# Patient Record
Sex: FEMALE | Race: BLACK OR AFRICAN AMERICAN | NOT HISPANIC OR LATINO | Employment: PART TIME | URBAN - METROPOLITAN AREA
[De-identification: names, ages, dates, MRNs, and addresses within clinical notes are randomized per-mention and may not be internally consistent; named-entity substitution may affect disease eponyms.]

---

## 2024-01-12 ENCOUNTER — HOSPITAL ENCOUNTER (EMERGENCY)
Facility: HOSPITAL | Age: 24
Discharge: HOME | End: 2024-01-12
Attending: EMERGENCY MEDICINE
Payer: COMMERCIAL

## 2024-01-12 VITALS
SYSTOLIC BLOOD PRESSURE: 135 MMHG | TEMPERATURE: 98 F | WEIGHT: 285.94 LBS | DIASTOLIC BLOOD PRESSURE: 79 MMHG | OXYGEN SATURATION: 100 % | RESPIRATION RATE: 18 BRPM | HEART RATE: 65 BPM

## 2024-01-12 DIAGNOSIS — S05.00XA CORNEAL ABRASION: Primary | ICD-10-CM

## 2024-01-12 PROCEDURE — 99284 EMERGENCY DEPT VISIT MOD MDM: CPT | Performed by: PHYSICIAN ASSISTANT

## 2024-01-12 PROCEDURE — 99283 EMERGENCY DEPT VISIT LOW MDM: CPT

## 2024-01-12 RX ORDER — GENTAMICIN SULFATE 3 MG/ML
1 SOLUTION/ DROPS OPHTHALMIC ONCE
Status: COMPLETED | OUTPATIENT
Start: 2024-01-12 | End: 2024-01-12

## 2024-01-12 RX ORDER — TETRACAINE HYDROCHLORIDE 5 MG/ML
1 SOLUTION OPHTHALMIC ONCE
Status: COMPLETED | OUTPATIENT
Start: 2024-01-12 | End: 2024-01-12

## 2024-01-12 RX ADMIN — TETRACAINE HYDROCHLORIDE 1 DROP: 5 SOLUTION OPHTHALMIC at 17:45

## 2024-01-12 RX ADMIN — FLUORESCEIN SODIUM 1 STRIP: 1 STRIP OPHTHALMIC at 17:45

## 2024-01-12 RX ADMIN — GENTAMICIN SULFATE 1 DROP: 3 SOLUTION/ DROPS OPHTHALMIC at 18:11

## 2024-01-12 NOTE — DISCHARGE INSTRUCTIONS
Return sooner if increased pain, fever, vomiting, worsening vision, redness.    Use gentamicin eye drops one drop in affected eye(s) every 4 hours while awake for 1 week.

## 2024-01-12 NOTE — ED PROVIDER NOTES
History  Chief Complaint   Patient presents with    Eye Injury     Pt c/o right eye pain after poking her eye this morning      22 yo female with right pain. States she scratched it with her finger/nail earlier today. Blurred vision since then. Constant. Photophobia. Wears glasses but not contacts. No other injuries.       History provided by:  Patient   used: No        None       History reviewed. No pertinent past medical history.    History reviewed. No pertinent surgical history.    History reviewed. No pertinent family history.  I have reviewed and agree with the history as documented.    E-Cigarette/Vaping     E-Cigarette/Vaping Substances     Social History     Tobacco Use    Smoking status: Never    Smokeless tobacco: Never   Substance Use Topics    Drug use: Never       Review of Systems   Constitutional:  Negative for chills and fever.   HENT:  Negative for ear pain and sore throat.    Eyes:  Positive for photophobia and pain. Negative for visual disturbance.   Respiratory:  Negative for cough and shortness of breath.    Cardiovascular:  Negative for chest pain and palpitations.   Gastrointestinal:  Negative for abdominal pain and vomiting.   Genitourinary:  Negative for dysuria and hematuria.   Musculoskeletal:  Negative for arthralgias and back pain.   Skin:  Negative for color change and rash.   Neurological:  Negative for seizures and syncope.   All other systems reviewed and are negative.      Physical Exam  Physical Exam  Vitals and nursing note reviewed.   Constitutional:       General: She is not in acute distress.     Appearance: She is well-developed.   HENT:      Head: Normocephalic and atraumatic.   Eyes:      Conjunctiva/sclera: Conjunctivae normal.      Comments: Corneal abrasion 3 o'clock position seen with staining. Pain was immediately relieved with tetracaine drops.    Cardiovascular:      Rate and Rhythm: Normal rate and regular rhythm.      Heart sounds: No murmur  heard.  Pulmonary:      Effort: Pulmonary effort is normal. No respiratory distress.      Breath sounds: Normal breath sounds.   Abdominal:      Palpations: Abdomen is soft.      Tenderness: There is no abdominal tenderness.   Musculoskeletal:         General: No swelling.      Cervical back: Neck supple.   Skin:     General: Skin is warm and dry.      Capillary Refill: Capillary refill takes less than 2 seconds.   Neurological:      Mental Status: She is alert.   Psychiatric:         Mood and Affect: Mood normal.         Vital Signs  ED Triage Vitals [01/12/24 1714]   Temperature Pulse Respirations Blood Pressure SpO2   98 °F (36.7 °C) 65 18 135/79 100 %      Temp Source Heart Rate Source Patient Position - Orthostatic VS BP Location FiO2 (%)   Tympanic Monitor Sitting Left arm --      Pain Score       --           Vitals:    01/12/24 1714   BP: 135/79   Pulse: 65   Patient Position - Orthostatic VS: Sitting         Visual Acuity      ED Medications  Medications   tetracaine 0.5 % ophthalmic solution 1 drop (1 drop Left Eye Given 1/12/24 1745)   fluorescein sodium sterile ophthalmic strip 1 strip (1 strip Left Eye Given 1/12/24 1745)   gentamicin (GARAMYCIN) 0.3 % ophthalmic solution 1 drop (1 drop Right Eye Given 1/12/24 1811)       Diagnostic Studies  Results Reviewed       None                   No orders to display              Procedures  Procedures         ED Course                               SBIRT 20yo+      Flowsheet Row Most Recent Value   Initial Alcohol Screen: US AUDIT-C     1. How often do you have a drink containing alcohol? 0 Filed at: 01/12/2024 1717   2. How many drinks containing alcohol do you have on a typical day you are drinking?  0 Filed at: 01/12/2024 1717   3b. FEMALE Any Age, or MALE 65+: How often do you have 4 or more drinks on one occassion? 0 Filed at: 01/12/2024 1717   Audit-C Score 0 Filed at: 01/12/2024 1717   JOEL: How many times in the past year have you...    Used an illegal  drug or used a prescription medication for non-medical reasons? Never Filed at: 01/12/2024 1717                      Medical Decision Making  Ddx includes but is not limited to corneal abrasion, ulcer, FB. Plan: staining/tetracaine    MDM: 22 yo with eye pain. Relieved with tetracaine. Abrasion seen with staining. Start drops. Ophtho f/u. No contacts. Return parameters provided. Pt understands and agrees with plan.      Risk  Prescription drug management.             Disposition  Final diagnoses:   Corneal abrasion - right eye     Time reflects when diagnosis was documented in both MDM as applicable and the Disposition within this note       Time User Action Codes Description Comment    1/12/2024  5:52 PM Carl Rodriguez Add [S05.00XA] Corneal abrasion     1/12/2024  5:52 PM Carl Rodriguez Modify [S05.00XA] Corneal abrasion right eye          ED Disposition       ED Disposition   Discharge    Condition   Stable    Date/Time   Fri Jan 12, 2024 1752    Comment   Kerry Madera discharge to home/self care.                   Follow-up Information       Follow up With Specialties Details Why Contact Info    [facility] [address] [address]  [phone]            There are no discharge medications for this patient.      No discharge procedures on file.    PDMP Review       None            ED Provider  Electronically Signed by             Carl Rodriguez PA-C  01/12/24 3740

## 2024-04-15 ENCOUNTER — HOSPITAL ENCOUNTER (EMERGENCY)
Facility: HOSPITAL | Age: 24
Discharge: HOME | End: 2024-04-15
Attending: EMERGENCY MEDICINE
Payer: COMMERCIAL

## 2024-04-15 VITALS
OXYGEN SATURATION: 99 % | RESPIRATION RATE: 18 BRPM | DIASTOLIC BLOOD PRESSURE: 73 MMHG | HEART RATE: 66 BPM | SYSTOLIC BLOOD PRESSURE: 135 MMHG | TEMPERATURE: 97.6 F

## 2024-04-15 DIAGNOSIS — R10.9 ABDOMINAL DISCOMFORT: Primary | ICD-10-CM

## 2024-04-15 PROCEDURE — 99283 EMERGENCY DEPT VISIT LOW MDM: CPT | Performed by: EMERGENCY MEDICINE

## 2024-04-15 PROCEDURE — 99283 EMERGENCY DEPT VISIT LOW MDM: CPT

## 2024-04-15 RX ORDER — POLYETHYLENE GLYCOL 3350 17 G/17G
17 POWDER, FOR SOLUTION ORAL ONCE
Status: COMPLETED | OUTPATIENT
Start: 2024-04-15 | End: 2024-04-15

## 2024-04-15 RX ORDER — POLYETHYLENE GLYCOL 3350 17 G/17G
17 POWDER, FOR SOLUTION ORAL DAILY
Qty: 10 EACH | Refills: 0 | Status: SHIPPED | OUTPATIENT
Start: 2024-04-15

## 2024-04-15 RX ADMIN — POLYETHYLENE GLYCOL 3350 17 G: 17 POWDER, FOR SOLUTION ORAL at 21:15

## 2024-04-16 NOTE — ED PROVIDER NOTES
History  Chief Complaint   Patient presents with    Abdominal Pain     Pt arrives c/o abdominal discomfort and bloating x 2 months. Pt denies N/V/D. Denies hx.      23-year-old female with no pertinent medical history presents to ED for evaluation of abdominal discomfort, bloating for 2 months.  Denies nausea, vomiting, diarrhea, dysuria, hematuria, increased urinary frequency.  Patient currently on her menstrual cycle.  Patient was seen at urgent care earlier today.  They advised her to come to ED for further evaluation.  Patient denies any recent change in symptomology.  Symptoms have been present for the past 2 months.  Patient describes early satiety.  Stating that she has not been as hungry as usual in the past 2 months.  Denies history of similar symptoms.  Abdominal discomfort is a crampy ache in a generalized fashion across abdomen.  Has not seen GI for evaluation of abdominal discomfort.  Denies recent change in diet. States that she does have bowel movement almost daily however the bowel movements are not large in size.  Patient stating that she feels the bowel movements should be larger.  Denies any blood in stool.         None       History reviewed. No pertinent past medical history.    History reviewed. No pertinent surgical history.    History reviewed. No pertinent family history.  I have reviewed and agree with the history as documented.    E-Cigarette/Vaping     E-Cigarette/Vaping Substances     Social History     Tobacco Use    Smoking status: Never    Smokeless tobacco: Never   Substance Use Topics    Drug use: Never       Review of Systems   Constitutional:  Negative for chills, diaphoresis, fatigue and fever.   HENT:  Negative for ear pain and sore throat.    Eyes:  Negative for pain and visual disturbance.   Respiratory:  Negative for cough and shortness of breath.    Cardiovascular:  Negative for chest pain and palpitations.   Gastrointestinal:  Positive for constipation. Negative for blood  in stool, diarrhea, nausea, rectal pain and vomiting.        Positive for abdominal discomfort, bloating   Genitourinary:  Negative for dysuria and hematuria.   Musculoskeletal:  Negative for arthralgias and back pain.   Skin:  Negative for color change and rash.   Neurological:  Negative for seizures and syncope.   All other systems reviewed and are negative.      Physical Exam  Physical Exam  Vitals and nursing note reviewed.   Constitutional:       General: She is not in acute distress.     Appearance: She is well-developed. She is not ill-appearing, toxic-appearing or diaphoretic.   HENT:      Head: Normocephalic and atraumatic.   Eyes:      Conjunctiva/sclera: Conjunctivae normal.   Cardiovascular:      Rate and Rhythm: Normal rate and regular rhythm.      Heart sounds: Normal heart sounds. No murmur heard.     No friction rub. No gallop.   Pulmonary:      Effort: Pulmonary effort is normal. No respiratory distress.      Breath sounds: Normal breath sounds. No stridor. No wheezing, rhonchi or rales.   Abdominal:      Palpations: Abdomen is soft. There is no shifting dullness, fluid wave, hepatomegaly, splenomegaly, mass or pulsatile mass.      Tenderness: There is no abdominal tenderness. There is no right CVA tenderness, left CVA tenderness, guarding or rebound. Negative signs include Mccullough's sign, Rovsing's sign and McBurney's sign.      Hernia: No hernia is present.   Musculoskeletal:         General: No swelling.      Cervical back: Neck supple.   Skin:     General: Skin is warm and dry.      Capillary Refill: Capillary refill takes less than 2 seconds.   Neurological:      Mental Status: She is alert.   Psychiatric:         Mood and Affect: Mood normal.         Vital Signs  ED Triage Vitals [04/15/24 2014]   Temperature Pulse Respirations Blood Pressure SpO2   97.6 °F (36.4 °C) 66 18 135/73 99 %      Temp Source Heart Rate Source Patient Position - Orthostatic VS BP Location FiO2 (%)   Temporal Monitor  Sitting Left arm --      Pain Score       --           Vitals:    04/15/24 2014   BP: 135/73   Pulse: 66   Patient Position - Orthostatic VS: Sitting         Visual Acuity      ED Medications  Medications   polyethylene glycol (MIRALAX) packet 17 g (17 g Oral Given 4/15/24 2115)       Diagnostic Studies  Results Reviewed       None                   No orders to display              Procedures  Procedures         ED Course                                             Medical Decision Making  23-year-old female presents to ED for evaluation of abdominal discomfort, bloating for 2 months as above.  On physical examination vital signs stable.  Afebrile, nontachycardic.  Alert and responding to questions appropriately.  No murmur.  Normal breath sounds.  Nontoxic-appearing.  No distinguishable tenderness to palpation.  Performed deep palpation of all 4 quadrants without producing pain.  No rash.  No overlying skin changes of abdomen.  No urinary symptoms.  Patient currently on her menstrual cycle.  Symptoms have been present for 2 months.  Presentation without concerning symptoms to suggest acute abdomen.  Certainly feel patient would benefit from follow-up with GI to discuss symptoms and see if further evaluation  indicated.  Referral and contact information to GI provided to patient.  In the meantime, providing patient with MiraLAX to trial to see if the abdominal discomfort, bloating is related to constipation.  MiraLAX given in ED.  Also sent prescription for MiraLAX to patient's preferred pharmacy.  Encouraged fiber intake, fluid consumption.  Certainly should return for any new or worsening symptoms.  Patient in agreement with plan.  Patient discharged.    Prior to discharge, discharge instructions were discussed with patient at bedside. Patient was provided both verbal and written instructions. Patient is understanding of the discharge instructions and is agreeable to plan of care. Return precautions were  discussed with patient bedside, patient verbalized understanding of signs and symptoms that would necessitate return to the ED. All questions were answered. Patient was comfortable with the plan of care and discharged to home.              Disposition  Final diagnoses:   Abdominal discomfort     Time reflects when diagnosis was documented in both MDM as applicable and the Disposition within this note       Time User Action Codes Description Comment    4/15/2024  9:00 PM Michael Canchola Add [R10.9] Abdominal discomfort           ED Disposition       ED Disposition   Discharge    Condition   Stable    Date/Time   Mon Apr 15, 2024  8:59 PM    Comment   Kerry Madera discharge to home/self care.                   Follow-up Information       Follow up With Specialties Details Why Contact Info Additional Information    [facility]   [address] [place] [address]  [address]  [facility] [address]  [phone] [facility] [place], [address] [place] [address], [address] [place], [place], [address] [phone]             Patient's Medications   Discharge Prescriptions    POLYETHYLENE GLYCOL (MIRALAX) 17 G PACKET    Take 17 g by mouth daily       Start Date: 4/15/2024 End Date: --       Order Dose: 17 g       Quantity: 10 each    Refills: 0           PDMP Review       None            ED Provider  Electronically Signed by             Michael Canchola PA-C  04/15/24 1412

## 2024-04-16 NOTE — DISCHARGE INSTRUCTIONS
Today I provided prescription for MiraLAX.  Trial MiraLAX to see if reduction of abdominal discomfort occurs.  I provided referral to GI.  Call make an appointment with GI for further evaluation and treatment of recent abdominal discomfort.  Please do return to ED for new or worsening symptoms.

## 2024-04-25 RX ORDER — FLUTICASONE PROPIONATE 50 MCG
2 SPRAY, SUSPENSION (ML) NASAL DAILY
COMMUNITY
Start: 2023-11-15

## 2024-04-30 ENCOUNTER — CONSULT (OUTPATIENT)
Dept: GASTROENTEROLOGY | Facility: CLINIC | Age: 24
End: 2024-04-30
Payer: COMMERCIAL

## 2024-04-30 VITALS
HEIGHT: 67 IN | TEMPERATURE: 97.6 F | RESPIRATION RATE: 18 BRPM | DIASTOLIC BLOOD PRESSURE: 76 MMHG | HEART RATE: 63 BPM | WEIGHT: 285 LBS | OXYGEN SATURATION: 99 % | BODY MASS INDEX: 44.73 KG/M2 | SYSTOLIC BLOOD PRESSURE: 118 MMHG

## 2024-04-30 DIAGNOSIS — E66.01 CLASS 3 SEVERE OBESITY DUE TO EXCESS CALORIES WITHOUT SERIOUS COMORBIDITY WITH BODY MASS INDEX (BMI) OF 40.0 TO 44.9 IN ADULT (HCC): ICD-10-CM

## 2024-04-30 DIAGNOSIS — R10.9 ABDOMINAL DISCOMFORT: Primary | ICD-10-CM

## 2024-04-30 DIAGNOSIS — R68.81 EARLY SATIETY: ICD-10-CM

## 2024-04-30 DIAGNOSIS — R14.0 BLOATING: ICD-10-CM

## 2024-04-30 DIAGNOSIS — R12 HEARTBURN: ICD-10-CM

## 2024-04-30 DIAGNOSIS — R10.13 EPIGASTRIC PAIN: ICD-10-CM

## 2024-04-30 PROCEDURE — 99203 OFFICE O/P NEW LOW 30 MIN: CPT | Performed by: PHYSICIAN ASSISTANT

## 2024-04-30 RX ORDER — PANTOPRAZOLE SODIUM 40 MG/1
40 TABLET, DELAYED RELEASE ORAL DAILY
Qty: 30 TABLET | Refills: 2 | Status: SHIPPED | OUTPATIENT
Start: 2024-04-30

## 2024-04-30 NOTE — PROGRESS NOTES
[facility] - Outpatient Consultation  Kerry Madera 23 y.o. female MRN: [id number]  Encounter: [id number]          ASSESSMENT AND PLAN:      1. Abdominal discomfort  2. Epigastric pain  3. Bloating  4. Early satiety  5. Heartburn  - She reports 2 months of epigastric and periumbilical abdominal discomfort and fullness with exacerbation postprandially along with heartburn and regurgitation  - This could be an NSAID induced gastritis or ulcer v underlying GERD  - Anti-reflux diet, weight loss  - Pantoprazole daily x 6-8 weeks, she will let me know if once daily dosing is not enough given her night time symptoms and we can increase it to BID  - No alarm symptoms to warrant EGD or imaging at this point  - Follow up in 2-3 months      6. Class 3 severe obesity due to excess calories without serious comorbidity with body mass index (BMI) of 40.0 to 44.9 in adult (HCC)  - She expressed interest in a possible sleeve or help with weight loss so we will refer her to weight management    ______________________________________________________________________    HPI:  Kerry is a 22 yo F with no significant PMH, presenting for evaluation of 2 months of abdominal discomfort and bloating.  She reports that she generally has the symptoms in the periumbilical or epigastric region.  She reports associated heartburn and regurgitation as well, especially at nighttime.  Eating exacerbates her symptoms and she reports some fullness at times with eating.  She does use NSAIDs at least several times per month during her menstrual cycle.  She reports that in February she had a weird menstrual cycle and use more NSAIDs than usual and that her symptoms seem to start after that.  She reports formed regular bowel movements but that her stools seem like they float.  She denies any black or bloody bowel movements.  She has gained about 10 pounds in the past 2 months or so.  She has not tried any medications  over-the-counter.  She was seen in the ER for symptoms about 2 weeks ago and no imaging or lab work was done and she was told to take MiraLAX as they thought she may be mildly constipated.  She reports that she took 1 dose of this and then after that was having daily good bowel movements without any changes in her symptoms.      REVIEW OF SYSTEMS:    CONSTITUTIONAL: Denies any fever, chills, rigors, and weight loss.  HEENT: No earache or tinnitus. Denies hearing loss or visual disturbances.  CARDIOVASCULAR: No chest pain or palpitations.   RESPIRATORY: Denies any cough, hemoptysis, shortness of breath or dyspnea on exertion.  GASTROINTESTINAL: As noted in the History of Present Illness.   GENITOURINARY: No problems with urination. Denies any hematuria or dysuria.  NEUROLOGIC: No dizziness or vertigo, denies headaches.   MUSCULOSKELETAL: Denies any muscle or joint pain.   SKIN: Denies skin rashes or itching.   ENDOCRINE: Denies excessive thirst. Denies intolerance to heat or cold.  PSYCHOSOCIAL: Denies depression or anxiety. Denies any recent memory loss.       Historical Information   History reviewed. No pertinent past medical history.  History reviewed. No pertinent surgical history.  Social History   Social History     Substance and Sexual Activity   Alcohol Use Not Currently     Social History     Substance and Sexual Activity   Drug Use Never     Social History     Tobacco Use   Smoking Status Never   Smokeless Tobacco Never     Family History   Problem Relation Age of Onset    No Known Problems Mother     No Known Problems Father     No Known Problems Sister     No Known Problems Brother     No Known Problems Maternal Grandmother     No Known Problems Maternal Grandfather     No Known Problems Paternal Grandmother     No Known Problems Paternal Grandfather     No Known Problems Maternal Aunt     No Known Problems Maternal Uncle     No Known Problems Paternal Aunt     No Known Problems Paternal Uncle     No  "Known Problems Cousin        Meds/Allergies       Current Outpatient Medications:     pantoprazole (PROTONIX) 40 mg tablet    polyethylene glycol (MIRALAX) 17 g packet    fluticasone (FLONASE) 50 mcg/act nasal spray    No Known Allergies        Objective     Blood pressure 118/76, pulse 63, temperature 97.6 °F (36.4 °C), temperature source Tympanic, resp. rate 18, height 5' 7\" (1.702 m), weight 129 kg (285 lb), last menstrual period 04/11/2024, SpO2 99%. Body mass index is 44.64 kg/m².        PHYSICAL EXAM:      General Appearance:   Alert, cooperative, no distress   HEENT:   Normocephalic, atraumatic, anicteric.     Neck:  Supple, symmetrical, trachea midline   Lungs:   Clear to auscultation bilaterally; no rales, rhonchi or wheezing; respirations unlabored    Heart::   Regular rate and rhythm; no murmur, rub, or gallop.   Abdomen:   Soft, non-tender, non-distended; normal bowel sounds; no masses, no organomegaly    Genitalia:   Deferred    Rectal:   Deferred    Extremities:  No cyanosis, clubbing or edema    Pulses:  2+ and symmetric    Skin:  No jaundice, rashes, or lesions    Lymph nodes:  No palpable cervical lymphadenopathy        Lab Results:   No visits with results within 1 Day(s) from this visit.   Latest known visit with results is:   No results found for any previous visit.         Radiology Results:   No results found.  "

## 2024-05-02 ENCOUNTER — TELEPHONE (OUTPATIENT)
Age: 24
End: 2024-05-02

## 2024-05-02 NOTE — TELEPHONE ENCOUNTER
Patient returned call regarding referral. Patient is interested in scheduling with bariatric surgery. Patients BMI- 44.64. Please call the patient back at [phone] to schedule accordingly. Patient has a referral on file.

## 2024-05-17 ENCOUNTER — CONSULT (OUTPATIENT)
Dept: BARIATRICS | Facility: CLINIC | Age: 24
End: 2024-05-17
Payer: COMMERCIAL

## 2024-05-17 VITALS
WEIGHT: 289 LBS | DIASTOLIC BLOOD PRESSURE: 80 MMHG | SYSTOLIC BLOOD PRESSURE: 130 MMHG | HEART RATE: 66 BPM | BODY MASS INDEX: 45.36 KG/M2 | HEIGHT: 67 IN | RESPIRATION RATE: 14 BRPM

## 2024-05-17 DIAGNOSIS — E66.01 CLASS 3 SEVERE OBESITY DUE TO EXCESS CALORIES WITHOUT SERIOUS COMORBIDITY WITH BODY MASS INDEX (BMI) OF 40.0 TO 44.9 IN ADULT (HCC): ICD-10-CM

## 2024-05-17 DIAGNOSIS — Z01.818 ENCOUNTER FOR OTHER PREPROCEDURAL EXAMINATION: Primary | ICD-10-CM

## 2024-05-17 DIAGNOSIS — E66.01 MORBID (SEVERE) OBESITY DUE TO EXCESS CALORIES (HCC): ICD-10-CM

## 2024-05-17 PROBLEM — E66.813 CLASS 3 SEVERE OBESITY DUE TO EXCESS CALORIES WITHOUT SERIOUS COMORBIDITY WITH BODY MASS INDEX (BMI) OF 40.0 TO 44.9 IN ADULT (HCC): Status: ACTIVE | Noted: 2024-05-17

## 2024-05-17 PROCEDURE — 99204 OFFICE O/P NEW MOD 45 MIN: CPT | Performed by: SURGERY

## 2024-05-17 NOTE — PROGRESS NOTES
"    BARIATRIC INITIAL CONSULT - BARIATRIC SURGERY    Kerry Madera 23 y.o. female MRN: [id number]  Unit/Bed#:  Encounter: [id number]      HPI:  Kerry Madera is a 23 y.o. female who presents with a longstanding history of morbid obesity and inability to sustain a meaningful weight loss.  She is employed at the [employer] and at a [employer]. She is interested in metabolic and bariatric surgery to improve her health. Monthly NSAIDs (menstrual cycle). Intermittent GERD. Denies DVT/PE. Denies tobacco.   Here today to discuss bariatric options.    Visit type: initial visit    Symptoms: inability to loss weight    Associated Symptoms: none    Associated Conditions: none  Disease Complications: none  Weight Loss Interest: high    Exercise Frequency:daily  Types of Exercise: walking      Review of Systems   Gastrointestinal:         GERD   All other systems reviewed and are negative.      Historical Information   No past medical history on file.  History reviewed. No pertinent surgical history.  Social History   Social History     Substance and Sexual Activity   Alcohol Use Not Currently     Social History     Substance and Sexual Activity   Drug Use Never     Social History     Tobacco Use   Smoking Status Never   Smokeless Tobacco Never     Family History:  Father (Obesity s/p sleeve gastrectomy)    Meds/Allergies   all medications and allergies reviewed  No Known Allergies    Objective       Current Vitals:   /80 (BP Location: Left arm, Patient Position: Sitting, Cuff Size: Large)   Pulse 66   Resp 14   Ht 5' 6.5\" (1.689 m)   Wt 131 kg (289 lb)   BMI 45.95 kg/m²       Invasive Devices       None                   Physical Exam  Constitutional:       Appearance: Normal appearance.   HENT:      Head: Atraumatic.      Nose: No rhinorrhea.   Eyes:      Extraocular Movements: Extraocular movements intact.   Cardiovascular:      Rate and Rhythm: Normal rate.   Pulmonary:      Effort: Pulmonary effort is normal. No " respiratory distress.   Abdominal:      General: Abdomen is flat. There is no distension.   Musculoskeletal:         General: Normal range of motion.      Cervical back: Normal range of motion.   Skin:     General: Skin is warm and dry.   Neurological:      General: No focal deficit present.      Mental Status: She is alert and oriented to person, place, and time.   Psychiatric:         Mood and Affect: Mood normal.         Behavior: Behavior normal.         Lab Results: I have personally reviewed pertinent lab results.    Imaging: I have personally reviewed pertinent reports.    EKG, Pathology, and Other Studies: I have personally reviewed pertinent reports.        Assessment/PLAN:    23 y.o. yo female with a long standing h/o of obesity and inability to sustain any meaningful weight loss on her own despite several attempts.    She is interested in the Laparoscopic sleeve gastrectomy.    Patient has been counseled about the risk of developing gastroesophageal reflux disease (GERD), worsening of current GERD and/or silent reflux. Patient has also been counseled on the risk of developing Edwards's esophagus (18%). As a result the patient may require treatment with medications, further interventions and possibly additional surgery. Patient will require routine endoscopic surveillance to monitor for these possible complications.     As a part of her pre op evaluation, she will be referred to a cardiologist and for a sleep evaluation and consult after successfully completing an evaluation with our pre-certification/, registered dietician and licensed clinical .    She needs an EGD to evaluate the anatomy of her GI tract prior to the operation.  I have spent over 45 minutes with her face to face in the office today discussing her options and details of the surgery. Over 50% of this was coordinating care.    She was given the opportunity to ask questions and I have answered all of them.  I  have discussed and educated the patient with regards to the components of our multidisciplinary program and the importance of compliance and follow up in the post operative period. The patient was also instructed with regards to the importance of behavior modification, nutritional counseling, support meeting attendance and lifestyle changes that are important to ensure success.     Although there is a great statistical chance of improvement or even resolution of most of her associated comorbidities, the results vary from patient to patient and they largely depend on her commitment and compliance.         Margot Hanley MD  5/17/2024  2:50 PM

## 2024-05-17 NOTE — LETTER
May 17, 2024     Britt Alvarado PA-C  [address]  [address]  [facility] [address]    Patient: Kerry Madera   YOB: 2000   Date of Visit: 5/17/2024       Dear SILVIA Alvarado:    Thank you for referring Kerry Madera to me for evaluation for metabolic and bariatric surgery. Below are my notes for this consultation.    If you have questions, please do not hesitate to call me. I look forward to following your patient along with you.         Sincerely,        Margot Hanley MD        CC: No Recipients    Margot Hanley MD  5/17/2024  2:53 PM  Sign when Signing Visit      BARIATRIC INITIAL CONSULT - BARIATRIC SURGERY    Kerry Madera 23 y.o. female MRN: [id number]  Unit/Bed#:  Encounter: [id number]      HPI:  Kerry Madera is a 23 y.o. female who presents with a longstanding history of morbid obesity and inability to sustain a meaningful weight loss.  She is employed at the [employer] and at a [employer]. She is interested in metabolic and bariatric surgery to improve her health. Monthly NSAIDs (menstrual cycle). Intermittent GERD. Denies DVT/PE. Denies tobacco.   Here today to discuss bariatric options.    Visit type: initial visit    Symptoms: inability to loss weight    Associated Symptoms: none    Associated Conditions: none  Disease Complications: none  Weight Loss Interest: high    Exercise Frequency:daily  Types of Exercise: walking      Review of Systems   Gastrointestinal:         GERD   All other systems reviewed and are negative.      Historical Information  No past medical history on file.  History reviewed. No pertinent surgical history.  Social History  Social History     Substance and Sexual Activity   Alcohol Use Not Currently     Social History     Substance and Sexual Activity   Drug Use Never     Social History     Tobacco Use   Smoking Status Never   Smokeless Tobacco Never     Family History:  Father (Obesity s/p sleeve gastrectomy)    Meds/Allergies  all medications and allergies  "reviewed  No Known Allergies    Objective      Current Vitals:   /80 (BP Location: Left arm, Patient Position: Sitting, Cuff Size: Large)   Pulse 66   Resp 14   Ht 5' 6.5\" (1.689 m)   Wt 131 kg (289 lb)   BMI 45.95 kg/m²       Invasive Devices       None                   Physical Exam  Constitutional:       Appearance: Normal appearance.   HENT:      Head: Atraumatic.      Nose: No rhinorrhea.   Eyes:      Extraocular Movements: Extraocular movements intact.   Cardiovascular:      Rate and Rhythm: Normal rate.   Pulmonary:      Effort: Pulmonary effort is normal. No respiratory distress.   Abdominal:      General: Abdomen is flat. There is no distension.   Musculoskeletal:         General: Normal range of motion.      Cervical back: Normal range of motion.   Skin:     General: Skin is warm and dry.   Neurological:      General: No focal deficit present.      Mental Status: She is alert and oriented to person, place, and time.   Psychiatric:         Mood and Affect: Mood normal.         Behavior: Behavior normal.         Lab Results: I have personally reviewed pertinent lab results.    Imaging: I have personally reviewed pertinent reports.    EKG, Pathology, and Other Studies: I have personally reviewed pertinent reports.        Assessment/PLAN:    23 y.o. yo female with a long standing h/o of obesity and inability to sustain any meaningful weight loss on her own despite several attempts.    She is interested in the Laparoscopic sleeve gastrectomy.    Patient has been counseled about the risk of developing gastroesophageal reflux disease (GERD), worsening of current GERD and/or silent reflux. Patient has also been counseled on the risk of developing Edwards's esophagus (18%). As a result the patient may require treatment with medications, further interventions and possibly additional surgery. Patient will require routine endoscopic surveillance to monitor for these possible complications.     As a part of " her pre op evaluation, she will be referred to a cardiologist and for a sleep evaluation and consult after successfully completing an evaluation with our pre-certification/, registered dietician and licensed clinical .    She needs an EGD to evaluate the anatomy of her GI tract prior to the operation.  I have spent over 45 minutes with her face to face in the office today discussing her options and details of the surgery. Over 50% of this was coordinating care.    She was given the opportunity to ask questions and I have answered all of them.  I have discussed and educated the patient with regards to the components of our multidisciplinary program and the importance of compliance and follow up in the post operative period. The patient was also instructed with regards to the importance of behavior modification, nutritional counseling, support meeting attendance and lifestyle changes that are important to ensure success.     Although there is a great statistical chance of improvement or even resolution of most of her associated comorbidities, the results vary from patient to patient and they largely depend on her commitment and compliance.         Margot Hanley MD  5/17/2024  2:50 PM

## 2024-07-23 ENCOUNTER — OFFICE VISIT (OUTPATIENT)
Dept: GASTROENTEROLOGY | Facility: CLINIC | Age: 24
End: 2024-07-23
Payer: COMMERCIAL

## 2024-07-23 VITALS
OXYGEN SATURATION: 98 % | SYSTOLIC BLOOD PRESSURE: 118 MMHG | WEIGHT: 290.2 LBS | HEART RATE: 67 BPM | BODY MASS INDEX: 46.64 KG/M2 | HEIGHT: 66 IN | TEMPERATURE: 98.7 F | DIASTOLIC BLOOD PRESSURE: 76 MMHG

## 2024-07-23 DIAGNOSIS — R12 HEARTBURN: ICD-10-CM

## 2024-07-23 DIAGNOSIS — R10.13 EPIGASTRIC PAIN: Primary | ICD-10-CM

## 2024-07-23 DIAGNOSIS — E66.01 MORBID (SEVERE) OBESITY DUE TO EXCESS CALORIES (HCC): ICD-10-CM

## 2024-07-23 PROCEDURE — 99214 OFFICE O/P EST MOD 30 MIN: CPT | Performed by: PHYSICIAN ASSISTANT

## 2024-07-23 RX ORDER — OMEPRAZOLE 40 MG/1
40 CAPSULE, DELAYED RELEASE ORAL DAILY
Qty: 30 CAPSULE | Refills: 2 | Status: SHIPPED | OUTPATIENT
Start: 2024-07-23

## 2024-07-23 NOTE — PROGRESS NOTES
[facility] - Outpatient Follow-up Note  Kerry Madera 23 y.o. female MRN: [id number]  Encounter: [id number]          ASSESSMENT AND PLAN:      1. Epigastric pain  2. Heartburn  3. Morbid (severe) obesity due to excess calories (HCC)  - She has persistent epigastric/periumbilical pain with heartburn and fullness at times that did not improve with pantoprazole 40 mg daily x 8 weeks  - We will trial omeprazole 40 mg daily  - Plan for EGD for further evaluation, rule out H. Pylori; she is also considering bariatric surgery so EGD will be helpful if she decides to move  forward with that process  - Anti-reflux diet  - Start metamucil or miralax daily to help regulate BMs      ______________________________________________________________________    SUBJECTIVE:  Kerry is a 22 yo F presenting for a 3 month follow up after she was seen because of 2 months of epigastric pain, periumbilical pain, fullness, and heartburn.  We trialed pantoprazole 40 mg daily and she reports after 6 to 8 weeks of doing this she really saw no difference so she discontinued it.  She denies any new or worsening symptoms.  She did see bariatrics and is considering bariatric surgery but is not sure yet.  She reports that generally her bowel movements have been regular though there are times where she does not have a bowel movement for 2 days and she thinks her symptoms might be worse during those times.  She is not taking any MiraLAX or fiber supplement to help regulate her bowel movements.  She denies any nausea or vomiting.  Her weight is stable.  She reports a lot of general fatigue and weakness and is wondering if her GI symptoms could be related to this.      REVIEW OF SYSTEMS IS OTHERWISE NEGATIVE.      Historical Information   History reviewed. No pertinent past medical history.  History reviewed. No pertinent surgical history.  Social History   Social History     Substance and Sexual Activity   Alcohol Use Not  "Currently     Social History     Substance and Sexual Activity   Drug Use Never     Social History     Tobacco Use   Smoking Status Never   Smokeless Tobacco Never     Family History   Problem Relation Age of Onset    No Known Problems Mother     No Known Problems Father     No Known Problems Sister     No Known Problems Brother     No Known Problems Maternal Grandmother     No Known Problems Maternal Grandfather     No Known Problems Paternal Grandmother     No Known Problems Paternal Grandfather     No Known Problems Maternal Aunt     No Known Problems Maternal Uncle     No Known Problems Paternal Aunt     No Known Problems Paternal Uncle     No Known Problems Cousin        Meds/Allergies       Current Outpatient Medications:     fluticasone (FLONASE) 50 mcg/act nasal spray    omeprazole (PriLOSEC) 40 MG capsule    polyethylene glycol (MIRALAX) 17 g packet    No Known Allergies        Objective     Blood pressure 118/76, pulse 67, temperature 98.7 °F (37.1 °C), temperature source Temporal, height 5' 6\" (1.676 m), weight 132 kg (290 lb 3.2 oz), SpO2 98%. Body mass index is 46.84 kg/m².      PHYSICAL EXAM:      General Appearance:   Alert, cooperative, no distress   HEENT:   Normocephalic, atraumatic, anicteric.     Neck:  Supple, symmetrical, trachea midline   Lungs:   Clear to auscultation bilaterally; no rales, rhonchi or wheezing; respirations unlabored    Heart::   Regular rate and rhythm; no murmur, rub, or gallop.   Abdomen:   Soft, non-tender, non-distended; normal bowel sounds; no masses, no organomegaly    Genitalia:   Deferred    Rectal:   Deferred    Extremities:  No cyanosis, clubbing or edema    Pulses:  2+ and symmetric    Skin:  No jaundice, rashes, or lesions    Lymph nodes:  No palpable cervical lymphadenopathy        Lab Results:   No visits with results within 1 Day(s) from this visit.   Latest known visit with results is:   No results found for any previous visit.         Radiology Results:   No " results found.

## 2024-07-23 NOTE — PATIENT INSTRUCTIONS
Scheduled date of EGD(as of today):7/30/24  Physician performing EGD:Gudelia  Location of EGD:[facility]  Instructions reviewed with patient by:Analisa REYES  Clearances:  none

## 2024-07-23 NOTE — H&P (VIEW-ONLY)
[facility] - Outpatient Follow-up Note  Kerry Madera 23 y.o. female MRN: [id number]  Encounter: [id number]          ASSESSMENT AND PLAN:      1. Epigastric pain  2. Heartburn  3. Morbid (severe) obesity due to excess calories (HCC)  - She has persistent epigastric/periumbilical pain with heartburn and fullness at times that did not improve with pantoprazole 40 mg daily x 8 weeks  - We will trial omeprazole 40 mg daily  - Plan for EGD for further evaluation, rule out H. Pylori; she is also considering bariatric surgery so EGD will be helpful if she decides to move  forward with that process  - Anti-reflux diet  - Start metamucil or miralax daily to help regulate BMs      ______________________________________________________________________    SUBJECTIVE:  Kerry is a 22 yo F presenting for a 3 month follow up after she was seen because of 2 months of epigastric pain, periumbilical pain, fullness, and heartburn.  We trialed pantoprazole 40 mg daily and she reports after 6 to 8 weeks of doing this she really saw no difference so she discontinued it.  She denies any new or worsening symptoms.  She did see bariatrics and is considering bariatric surgery but is not sure yet.  She reports that generally her bowel movements have been regular though there are times where she does not have a bowel movement for 2 days and she thinks her symptoms might be worse during those times.  She is not taking any MiraLAX or fiber supplement to help regulate her bowel movements.  She denies any nausea or vomiting.  Her weight is stable.  She reports a lot of general fatigue and weakness and is wondering if her GI symptoms could be related to this.      REVIEW OF SYSTEMS IS OTHERWISE NEGATIVE.      Historical Information   History reviewed. No pertinent past medical history.  History reviewed. No pertinent surgical history.  Social History   Social History     Substance and Sexual Activity   Alcohol Use Not  "Currently     Social History     Substance and Sexual Activity   Drug Use Never     Social History     Tobacco Use   Smoking Status Never   Smokeless Tobacco Never     Family History   Problem Relation Age of Onset    No Known Problems Mother     No Known Problems Father     No Known Problems Sister     No Known Problems Brother     No Known Problems Maternal Grandmother     No Known Problems Maternal Grandfather     No Known Problems Paternal Grandmother     No Known Problems Paternal Grandfather     No Known Problems Maternal Aunt     No Known Problems Maternal Uncle     No Known Problems Paternal Aunt     No Known Problems Paternal Uncle     No Known Problems Cousin        Meds/Allergies       Current Outpatient Medications:     fluticasone (FLONASE) 50 mcg/act nasal spray    omeprazole (PriLOSEC) 40 MG capsule    polyethylene glycol (MIRALAX) 17 g packet    No Known Allergies        Objective     Blood pressure 118/76, pulse 67, temperature 98.7 °F (37.1 °C), temperature source Temporal, height 5' 6\" (1.676 m), weight 132 kg (290 lb 3.2 oz), SpO2 98%. Body mass index is 46.84 kg/m².      PHYSICAL EXAM:      General Appearance:   Alert, cooperative, no distress   HEENT:   Normocephalic, atraumatic, anicteric.     Neck:  Supple, symmetrical, trachea midline   Lungs:   Clear to auscultation bilaterally; no rales, rhonchi or wheezing; respirations unlabored    Heart::   Regular rate and rhythm; no murmur, rub, or gallop.   Abdomen:   Soft, non-tender, non-distended; normal bowel sounds; no masses, no organomegaly    Genitalia:   Deferred    Rectal:   Deferred    Extremities:  No cyanosis, clubbing or edema    Pulses:  2+ and symmetric    Skin:  No jaundice, rashes, or lesions    Lymph nodes:  No palpable cervical lymphadenopathy        Lab Results:   No visits with results within 1 Day(s) from this visit.   Latest known visit with results is:   No results found for any previous visit.         Radiology Results:   No " results found.   Minoxidil Counseling: Minoxidil is a topical medication which can increase blood flow where it is applied. It is uncertain how this medication increases hair growth. Side effects are uncommon and include stinging and allergic reactions.

## 2024-07-29 ENCOUNTER — TELEPHONE (OUTPATIENT)
Dept: SURGERY | Facility: HOSPITAL | Age: 24
End: 2024-07-29

## 2024-07-30 ENCOUNTER — ANESTHESIA (OUTPATIENT)
Dept: GASTROENTEROLOGY | Facility: HOSPITAL | Age: 24
End: 2024-07-30

## 2024-07-30 ENCOUNTER — HOSPITAL ENCOUNTER (OUTPATIENT)
Dept: GASTROENTEROLOGY | Facility: HOSPITAL | Age: 24
Setting detail: OUTPATIENT SURGERY
Discharge: HOME | End: 2024-07-30
Payer: COMMERCIAL

## 2024-07-30 ENCOUNTER — ANESTHESIA EVENT (OUTPATIENT)
Dept: GASTROENTEROLOGY | Facility: HOSPITAL | Age: 24
End: 2024-07-30

## 2024-07-30 VITALS
HEART RATE: 66 BPM | WEIGHT: 289.46 LBS | OXYGEN SATURATION: 99 % | HEIGHT: 67 IN | TEMPERATURE: 97.8 F | DIASTOLIC BLOOD PRESSURE: 63 MMHG | BODY MASS INDEX: 45.43 KG/M2 | SYSTOLIC BLOOD PRESSURE: 143 MMHG | RESPIRATION RATE: 20 BRPM

## 2024-07-30 DIAGNOSIS — R12 HEARTBURN: ICD-10-CM

## 2024-07-30 DIAGNOSIS — E66.01 MORBID (SEVERE) OBESITY DUE TO EXCESS CALORIES (HCC): ICD-10-CM

## 2024-07-30 DIAGNOSIS — R10.13 EPIGASTRIC PAIN: ICD-10-CM

## 2024-07-30 LAB
EXT PREGNANCY TEST URINE: NEGATIVE
EXT. CONTROL: NORMAL
GLUCOSE SERPL-MCNC: 87 MG/DL (ref 65–140)

## 2024-07-30 PROCEDURE — 81025 URINE PREGNANCY TEST: CPT | Performed by: ANESTHESIOLOGY

## 2024-07-30 PROCEDURE — 82948 REAGENT STRIP/BLOOD GLUCOSE: CPT

## 2024-07-30 PROCEDURE — 88305 TISSUE EXAM BY PATHOLOGIST: CPT | Performed by: STUDENT IN AN ORGANIZED HEALTH CARE EDUCATION/TRAINING PROGRAM

## 2024-07-30 PROCEDURE — 43239 EGD BIOPSY SINGLE/MULTIPLE: CPT | Performed by: INTERNAL MEDICINE

## 2024-07-30 RX ORDER — LIDOCAINE HYDROCHLORIDE 20 MG/ML
INJECTION, SOLUTION EPIDURAL; INFILTRATION; INTRACAUDAL; PERINEURAL AS NEEDED
Status: DISCONTINUED | OUTPATIENT
Start: 2024-07-30 | End: 2024-07-30

## 2024-07-30 RX ORDER — GLYCOPYRROLATE 0.2 MG/ML
INJECTION INTRAMUSCULAR; INTRAVENOUS AS NEEDED
Status: DISCONTINUED | OUTPATIENT
Start: 2024-07-30 | End: 2024-07-30

## 2024-07-30 RX ORDER — PROPOFOL 10 MG/ML
INJECTION, EMULSION INTRAVENOUS AS NEEDED
Status: DISCONTINUED | OUTPATIENT
Start: 2024-07-30 | End: 2024-07-30

## 2024-07-30 RX ORDER — SODIUM CHLORIDE, SODIUM LACTATE, POTASSIUM CHLORIDE, CALCIUM CHLORIDE 600; 310; 30; 20 MG/100ML; MG/100ML; MG/100ML; MG/100ML
50 INJECTION, SOLUTION INTRAVENOUS CONTINUOUS
Status: DISCONTINUED | OUTPATIENT
Start: 2024-07-30 | End: 2024-08-03 | Stop reason: HOSPADM

## 2024-07-30 RX ADMIN — PROPOFOL 100 MG: 10 INJECTION, EMULSION INTRAVENOUS at 11:38

## 2024-07-30 RX ADMIN — PROPOFOL 200 MG: 10 INJECTION, EMULSION INTRAVENOUS at 11:37

## 2024-07-30 RX ADMIN — GLYCOPYRROLATE 0.2 MG: 0.2 INJECTION INTRAMUSCULAR; INTRAVENOUS at 11:35

## 2024-07-30 RX ADMIN — LIDOCAINE HYDROCHLORIDE 100 MG: 20 INJECTION, SOLUTION EPIDURAL; INFILTRATION; INTRACAUDAL; PERINEURAL at 11:37

## 2024-07-30 RX ADMIN — SODIUM CHLORIDE, SODIUM LACTATE, POTASSIUM CHLORIDE, AND CALCIUM CHLORIDE 50 ML/HR: .6; .31; .03; .02 INJECTION, SOLUTION INTRAVENOUS at 10:37

## 2024-07-30 RX ADMIN — PROPOFOL 100 MG: 10 INJECTION, EMULSION INTRAVENOUS at 11:40

## 2024-07-30 NOTE — INTERVAL H&P NOTE
H&P reviewed. After examining the patient I find no changes in the patients condition since the H&P had been written.    Vitals:    07/30/24 1022   BP: 136/88   Pulse: 69   Resp: 13   Temp: 99.5 °F (37.5 °C)   SpO2: 100%

## 2024-07-30 NOTE — ANESTHESIA PREPROCEDURE EVALUATION
"Procedure:  EGD    Prediabetes not on medication    Relevant Problems   Other   (+) Class 3 severe obesity due to excess calories without serious comorbidity with body mass index (BMI) of 40.0 to 44.9 in adult (HCC)        Physical Exam    Airway    Mallampati score: II  TM Distance: >3 FB  Neck ROM: full     Dental       Cardiovascular      Pulmonary      Other Findings  post-pubertal.      Anesthesia Plan  ASA Score- 3     Anesthesia Type- IV sedation with anesthesia with ASA Monitors.         Additional Monitors:     Airway Plan:     Comment: Recent labs personally reviewed:  No results found for: \"WBC\", \"HGB\", \"PLT\"  No results found for: \"NA\", \"K\", \"BUN\", \"CREATININE\", \"GLUCOSE\"  No results found for: \"PTT\"   No results found for: \"INR\"    Blood type     I, Oksana Wesley MD, have personally seen and evaluated the patient prior to anesthetic care.  I have reviewed the pre-anesthetic record, medical history, allergies, medications and any other medical records if appropriate to the anesthetic care.  If a CRNA is involved in the case, I have reviewed the CRNA assessment, if present, and agree. Patient consented for IV Sedation, general anesthesia as back up. Discussed risks of aspiration, IV infiltration, indications for conversion to general anesthesia. All questions and concerns addressed.     .       Plan Factors-Exercise tolerance (METS): >4 METS.    Chart reviewed.   Existing labs reviewed. Patient summary reviewed.    Patient is not a current smoker.  Patient did not smoke on day of surgery.    Obstructive sleep apnea risk education given perioperatively.        Induction- intravenous.    Postoperative Plan-     Perioperative Resuscitation Plan - Level 1 - Full Code.       Informed Consent- Anesthetic plan and risks discussed with patient.  I personally reviewed this patient with the CRNA. Discussed and agreed on the Anesthesia Plan with the CRNA..        "

## 2024-07-30 NOTE — ANESTHESIA POSTPROCEDURE EVALUATION
Post-Op Assessment Note    CV Status:  Stable  Pain Score: 0    Pain management: adequate       Mental Status:  Sleepy   Hydration Status:  Euvolemic   PONV Controlled:  Controlled   Airway Patency:  Patent     Post Op Vitals Reviewed: Yes    No anethesia notable event occurred.    Staff: CRNA               /58 (07/30/24 1146)    Temp 97.8 °F (36.6 °C) (07/30/24 1146)    Pulse 87 (07/30/24 1146)   Resp 16 (07/30/24 1146)    SpO2 95 % (07/30/24 1146)

## 2024-08-01 PROCEDURE — 88305 TISSUE EXAM BY PATHOLOGIST: CPT | Performed by: STUDENT IN AN ORGANIZED HEALTH CARE EDUCATION/TRAINING PROGRAM

## 2024-08-23 ENCOUNTER — TELEPHONE (OUTPATIENT)
Age: 24
End: 2024-08-23

## 2024-08-23 NOTE — TELEPHONE ENCOUNTER
Pt called to find out what the next step is to proceed with WM surgery.  Pt had a consult on 5/17/24 with Dr. Gill.  I explained to the pt the office is currently closed but I will have someone reach out to her on Monday, Pt is ok with this

## 2024-08-27 ENCOUNTER — PATIENT MESSAGE (OUTPATIENT)
Dept: BARIATRICS | Facility: CLINIC | Age: 24
End: 2024-08-27

## 2024-09-09 ENCOUNTER — CLINICAL SUPPORT (OUTPATIENT)
Dept: BARIATRICS | Facility: CLINIC | Age: 24
End: 2024-09-09

## 2024-09-09 DIAGNOSIS — Z98.84 BARIATRIC SURGERY STATUS: Primary | ICD-10-CM

## 2024-09-09 PROCEDURE — RECHECK

## 2024-09-13 NOTE — PROGRESS NOTES
Bariatric Behavioral Health Evaluation    Presenting Problem: 23 year old female ( 2000) here for behavioral health evaluation. Patient had initial consult with Dr. Gill 24.     Is the patient seeking Bariatric Surgery Eval? Yes  If yes how long have you researched this surgery option. Patient recently started looking into bariatric surgery after talking with her GI doctor about it. Her dad had bariatric surgery about 5 years ago.    Realizes Post- Op Requirements? Yes, but would benefit from more education.     Pre-morbid level of function and history of present illness: Diagnosis of GERD; patient reports struggling with her weight since childhood.    Psychiatric/Psychological Treatment Diagnosis: Patient denies any current mental health diagnosis or treatment. Patient educated on the benefits of outpatient therapy to develop positive coping skills and habits for success long term, resource list provided.    Outpatient Counselor No     Psychiatrist No     Have you had Inpatient Treatment? No    Family Constellation: Patient currently lives with her mom, dad, 14 year old sister, and 10 year old brother.     Trauma/Abuse History:   Patient denies this    Additional comments/stressors related to family/relationships/peer support: Patient identifies her mom and her dad as her support. Patient identifies finances as well as work as current stressors.    Physical/Psychological Assessment:     Appearance: appropriate  Sociability: average  Affect: appropriate  Mood: calm  Thought Process: coherent  Speech: normal  Content: no impairment  Orientation: person  Yes , place  Yes , time  Yes , normal attention span  Yes , normal memory  Yes  , and normal judgement  Yes   Insight: emotional  good    Risk Assessment:     none    Recommendations: Recommended for surgery  yes    Risk of Harm to Self or Others: Patient denies SI or HI     Observation:     Interviews: This interview only.    Based on the previous  information, the client presents the following risk of harm to self or others: low     Note: Patient here for behavioral health evaluation. Patient denies any current mental health diagnosis or treatment. Patient educated on the benefits of outpatient therapy to develop positive coping skills and habits for success long term, resource list provided. Patient not currently pregnant, educated patient on the reproductive hormonal changes post surgery. Encouraged patient to discuss birth control options with their doctor prior to surgery to protect against pregnancy for at least 12-18 months after surgery. Patient denies any current substance use. Denies tobacco use. Denies alcohol use. Patient educated on the impact of nicotine and alcohol on the post bariatric patient. Patient meets criteria for surgery at this program and will follow up with RD next month.  Patient currently lives with her mom, dad, 14 year old sister, and 10 year old brother. 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 Was also working at the [employer] in the [facility], but will have her last day next week. Was going to [employer] for [employer], but took time off- just has one more semester to get her associates. Enjoys watching sports with her dad, watches reality shows with her mom, enjoys art with her mom. Britt is an important part of her life, used to attend [employer] regularly, but hasn't had as much time lately however still utilizes her britt as a positive coping skill. Patient will stretch daily, walking as well- 5k steps daily. Getting about 7 hours of sleep, minimal issues. Will skip breakfast at times, Discussed the importance of regular meals as well as paying attention to body cues for hunger and satiety. Drinking about 1.5 bottles of water, 32 oz juice, 16 oz sweetened iced tea, 8 oz herbal tea. Enjoys drawing and listening to music as positive coping skills. Mom and  dad do the cooking. Will eat dinners together. Will usually get lunch out.   Goals discussed:  Continue to increase activity  Be mindful not to skip meals  Increase water intake  Be mindful of mindless snacking  Continue to set boundaries for self care  Workflow reviewed:   Psych and/or D+A Clearance: N/A  PCP Letter: Needs PCP  Support Group: No longer required, strongly encouraged  Surgeon Appt: 5/17/24  EGD: completed 7/30/24 by GI  Cardiac Risk Assessment: scheduled 1/3/25  Sleep Studies: N/A (SB 4/8)  Blood work: Needs to complete- also vitamin D was low (9) as of 5/2024- to recheck to see if we need to treat  Nicotine test: N/A  Weight loss meds: N/A  Required weight checks: 3+1  Weight Loss: Not required, encouraged positive lifestyle changes.  WESLEY BlueW

## 2024-09-16 NOTE — PROGRESS NOTES
"Bariatric Nutrition Assessment Note- Evaluation Note   Insurance: 3 required monthly weight checks +1 with surgeon      Type of surgery    Vertical sleeve gastrectomy  Surgery Date: TBD  Surgeon: Consult with Dr. Talon Vargasn 05/17/2024      Nutrition Assessment   Kerry Madera  23 y.o.  adult     Ht 5' 6.5\" (1.689 m)   Wt 134 kg (295 lb 9.6 oz)   BMI 47.00 kg/m²     Initial Weight at Surgeon Consult: 289#   BMI: 45.9  Height: 5'6.5\"  Eval Weight: 295.6#   BMI: 47  Wt with BMI of 25: 157#  Pre-Op Excess Wt: 132#  BMI to Qualify at 40 = 251.5#  BMI to Qualify at 35 = 220#  PMH includes:  Obesity    Pt advised not to gain weight during preop process. Pt encouraged to lose weight via healthy eating and exercise. Pt may follow Liver Shrinking diet 2 weeks or more  prior to DOS depending on BMI at time. This diet will promote weight loss.    [place] Jeor Equation:    [id number]  Weight Maintenance 2500  Estimated calories for weight loss 3850-2326 ( 1-2# per wk wt loss - sedentary )  Estimated protein needs (1.0-1.5 gms/kg IBW )   Estimated fluid needs 71-83 oz (30-35 ml/kg IBW )      Computed NAFLD Fibrosis Score unavailable. One or more values for this score either were not found within the given timeframe or did not fit some other criterion.    Weight History Reason for WLS:  Improve health   Onset of Obesity: Childhood  Family history of obesity: Yes  (Dad had sleeve)  Wt Loss Attempts: Counseling with  MD - GYN - ordered metformin  Exercise  Self Created Diets (Portion Control, Healthy Food Choices, etc.)  Patient has tried the above for 6 months or more with insufficient weight loss or weight regain, which is why patient has requested to be evaluated for weight loss surgery today  Maximum Wt Lost: 10#      Review of History and Medications   OTC: Vitamin D  - 2000 IU X 2 day   Past Medical History:   Diagnosis Date    GERD (gastroesophageal reflux disease)      Past Surgical History:   Procedure Laterality " Date    NO PAST SURGERIES       Social History     Socioeconomic History    Marital status: Single     Spouse name: Not on file    Number of children: Not on file    Years of education: Not on file    Highest education level: Not on file   Occupational History    Not on file   Tobacco Use    Smoking status: Never    Smokeless tobacco: Never   Vaping Use    Vaping status: Never Used   Substance and Sexual Activity    Alcohol use: Not Currently    Drug use: Never    Sexual activity: Not on file   Other Topics Concern    Not on file   Social History Narrative    Not on file     Social Determinants of Health     Financial Resource Strain: Not on file   Food Insecurity: Not on file   Transportation Needs: Not on file   Physical Activity: Not on file   Stress: Not on file   Social Connections: Not on file   Intimate Partner Violence: Not on file   Housing Stability: Not on file       Current Outpatient Medications:     fluticasone (FLONASE) 50 mcg/act nasal spray, 2 sprays into each nostril if needed, Disp: , Rfl:     omeprazole (PriLOSEC) 40 MG capsule, Take 1 capsule (40 mg total) by mouth daily, Disp: 30 capsule, Rfl: 2    polyethylene glycol (MIRALAX) 17 g packet, Take 17 g by mouth daily (Patient taking differently: Take 17 g by mouth if needed), Disp: 10 each, Rfl: 0  Food Intake and Lifestyle Assessment   Food Intake Assessment completed via usual diet recall  Breakfast: Skips  - sometimes cereal   Lunch: Soup or Fast Food  Dinner: Chicken rice - salad   Snack: Chips not regularly  Beverage intake: water 24 oz  , 2% milk, juice - 32 oz  regular soda (not daily), and Iced tea - 2 cups   Protein supplement: None - did in past   Portions:  6 oz  Protein  1 c Starch   1/2c Vegetable    Estimated protein intake per day: 70-75  Estimated fluid intake per day: Inadequate   Meals eaten away from home: Frequent - most days   Typical meal pattern: 2-3  meals per day and 0-2 snacks per day  Eating Behaviors: Consumption of  "high calorie/ high fat foods, Consumption of high calorie beverages, Large portion sizes, Mindless eating, and Emotional eating  Food allergies or intolerances: No Known Allergies or intolerances  Cultural or Judaism considerations: None    Physical Assessment  Physical Activity  Types of exercise: Walking - stretches  Current physical limitations: None    Psychosocial Assessment   Support systems: relative(s)  Mom & Dad (had sleeve)  Friends - haven't told yet   Socioeconomic factors:   Employed at the [employer] (quitting in 2 weeks)  and at a [employer].   Nutrition Diagnosis  Diagnosis: Overweight / Obesity (NC-3.3)  Related to: Physical inactivity and Excessive energy intake  As Evidenced by: BMI >25     Nutrition Prescription: Recommend the following diet  Regular    Interventions and Teaching   Discussed pre-op and post-op nutrition guidelines.       Patient educated and handouts provided.  Surgical changes to stomach / GI  Capacity of post-surgery stomach  Diet progression  Adequate hydration  Sugar and fat restriction to decrease \"dumping syndrome\"  Fat restriction to decrease steatorrhea  Expected weight loss  Weight loss plateaus/ possibility of weight regain  Exercise  Suggestions for pre-op diet  Nutrition considerations after surgery  Protein supplements  Meal planning and preparation  Appropriate carbohydrate, protein, and fat intake, and food/fluid choices to maximize safe weight loss, nutrient intake, and tolerance   Dietary and lifestyle changes  Possible problems with poor eating habits  Intuitive eating  Techniques for self monitoring and keeping daily food journal  Potential for food intolerance after surgery, and ways to deal with them including: lactose intolerance, nausea, reflux, vomiting, diarrhea, food intolerance, appetite changes, gas  Vitamin / Mineral supplementation of Multivitamin with minerals, Calcium, Vitamin B12, Iron, Fat Soluble vitamins, and Vitamin D    Patient is not " currently pregnant and doesn't desire to become pregnant a minimum of one year post-op    Education provided to: patient    Barriers to learning: No barriers identified    Readiness to change: preparation    Prior research on procedure: internet, discussed with provider, and friends or family    Comprehension: verbalizes understanding     Expected Compliance: good  Recommendations  Pt is an appropriate candidate for surgery. Yes  Evaluation / Monitoring  Dietitian to Monitor: Eating pattern as discussed Tolerance of nutrition prescription Body weight Lab values Physical activity Bowel pattern  ReCheck Vitamin D  Goals  Eliminate sugar sweetened beverages, Food journal, Exercise 30 minutes 5 times per week, Complete lession plans 1-6, Eat 3 meals per day, and Eliminate mindless snacking  Follow Pre-Surgery guidelines  > Trial Baritastic for food logging  > Establish regular meal pattern - include fruits, vegetables and whole grains  > Avoid skipping meals- Can use protein drink as meal replacement  > Decrease portions  > Focus on protein - include lean protein at each meal and snack - Learn to eat protein first  > Limit processed foods, fast foods and dining away from home  > Include meal prepping  > Limit snacks - healthier choices and portion; avoid grazing  > Slow pace of eating and sip fluids  - practice 30/60 minute rule  > Reduce caffeine/ eliminate by day of surgery  > Eliminate carbonation by pre-op diet -   > ncrease water intake - 64 oz - decrease juice  > Increase physical activity/establish exercise regimen   > Start multi vitamin and Continue  Vitamin D 2000IU X2 daily until retested   Work on skills to cope with emotional eating/mindfull eating  Pre-op weight loss not required but advised not to gain weight  Glycemic Control: HgA1c 5.5 on 7/8/24  Other ReCheck Vitamin D - may need script - has been taking supplement OTC  F/U next month with bariatric provider      Time Spent:   1 Hour

## 2024-09-17 ENCOUNTER — CLINICAL SUPPORT (OUTPATIENT)
Dept: BARIATRICS | Facility: CLINIC | Age: 24
End: 2024-09-17

## 2024-09-17 VITALS — HEIGHT: 67 IN | WEIGHT: 293 LBS | BODY MASS INDEX: 45.99 KG/M2

## 2024-09-17 DIAGNOSIS — E55.9 VITAMIN D DEFICIENCY: ICD-10-CM

## 2024-09-17 DIAGNOSIS — E66.01 OBESITY, CLASS III, BMI 40-49.9 (MORBID OBESITY) (HCC): ICD-10-CM

## 2024-09-17 DIAGNOSIS — Z01.818 PRE-OP TESTING: Primary | ICD-10-CM

## 2024-09-17 DIAGNOSIS — Z71.89 ENCOUNTER FOR PRE-BARIATRIC SURGERY COUNSELING AND EDUCATION: Primary | ICD-10-CM

## 2024-09-17 PROCEDURE — RECHECK

## 2024-10-04 ENCOUNTER — CLINICAL SUPPORT (OUTPATIENT)
Dept: BARIATRICS | Facility: CLINIC | Age: 24
End: 2024-10-04

## 2024-10-04 VITALS — BODY MASS INDEX: 47.41 KG/M2 | WEIGHT: 293 LBS

## 2024-10-04 DIAGNOSIS — E66.01 OBESITY, CLASS III, BMI 40-49.9 (MORBID OBESITY) (HCC): Primary | ICD-10-CM

## 2024-10-04 PROCEDURE — RECHECK

## 2024-10-04 NOTE — PROGRESS NOTES
"Bariatric Nutrition Assessment Note- PreOp  Insurance: 3 required monthly weight checks +1 with surgeon  2/3 Today      Type of surgery    Vertical sleeve gastrectomy  Surgery Date: TBD  Surgeon: Consult with Dr. Talon adrian 05/17/2024      Nutrition Assessment   Kerry Madera  23 y.o.  adult     Wt 135 kg (298 lb 3.2 oz)   BMI 47.41 kg/m²     Initial Weight at Surgeon Consult: 289#   BMI: 45.9  Height: 5'6.5\"  Current Weight: 298.2#   Eval Weight: 295.6#   BMI: 47  Wt with BMI of 25: 157#  Pre-Op Excess Wt: 132#  BMI to Qualify at 40 = 251.5#  BMI to Qualify at 35 = 220#  PMH includes:  Obesity    Pt advised not to gain weight during preop process. Pt encouraged to lose weight via healthy eating and exercise. Pt may follow Liver Shrinking diet 2 weeks or more  prior to DOS depending on BMI at time. This diet will promote weight loss.    [place]- St. Jeor Equation:    QMP=6281  Weight Maintenance 2500  Estimated calories for weight loss 3652-4639 ( 1-2# per wk wt loss - sedentary )  Estimated protein needs (1.0-1.5 gms/kg IBW )   Estimated fluid needs 71-83 oz (30-35 ml/kg IBW )      Computed NAFLD Fibrosis Score unavailable. One or more values for this score either were not found within the given timeframe or did not fit some other criterion.    Weight History Reason for WLS:  Improve health   Onset of Obesity: Childhood  Family history of obesity: Yes  (Dad had sleeve)  Wt Loss Attempts: Counseling with  MD - GYN - ordered metformin  Exercise  Self Created Diets (Portion Control, Healthy Food Choices, etc.)  Patient has tried the above for 6 months or more with insufficient weight loss or weight regain, which is why patient has requested to be evaluated for weight loss surgery today  Maximum Wt Lost: 10#      Review of History and Medications   OTC: Vitamin D  - 2000 IU X 2 day   Past Medical History:   Diagnosis Date    GERD (gastroesophageal reflux disease)      Past Surgical History:   Procedure Laterality " Date    NO PAST SURGERIES       Social History     Socioeconomic History    Marital status: Single     Spouse name: Not on file    Number of children: Not on file    Years of education: Not on file    Highest education level: Not on file   Occupational History    Not on file   Tobacco Use    Smoking status: Never    Smokeless tobacco: Never   Vaping Use    Vaping status: Never Used   Substance and Sexual Activity    Alcohol use: Not Currently    Drug use: Never    Sexual activity: Not on file   Other Topics Concern    Not on file   Social History Narrative    Not on file     Social Determinants of Health     Financial Resource Strain: Not on file   Food Insecurity: Not on file   Transportation Needs: Not on file   Physical Activity: Not on file   Stress: Not on file   Social Connections: Not on file   Intimate Partner Violence: Not on file   Housing Stability: Not on file       Current Outpatient Medications:     fluticasone (FLONASE) 50 mcg/act nasal spray, 2 sprays into each nostril if needed, Disp: , Rfl:     omeprazole (PriLOSEC) 40 MG capsule, Take 1 capsule (40 mg total) by mouth daily, Disp: 30 capsule, Rfl: 2    polyethylene glycol (MIRALAX) 17 g packet, Take 17 g by mouth daily (Patient taking differently: Take 17 g by mouth if needed), Disp: 10 each, Rfl: 0  Food Intake and Lifestyle Assessment   Food Intake Assessment completed via usual diet recall  Breakfast: Skips  - sometimes cereal   Lunch: Soup or Fast Food  Dinner: Chicken rice - salad   Snack: Chips not regularly  Beverage intake: water 24 oz  , 2% milk, juice - 32 oz  regular soda (not daily), and Iced tea - 2 cups   Protein supplement: None - did in past   Portions:  6 oz  Protein  1 c Starch   1/2c Vegetable    Estimated protein intake per day: 70-75  Estimated fluid intake per day: Inadequate   Meals eaten away from home: Frequent - most days   Typical meal pattern: 2-3  meals per day and 0-2 snacks per day  Eating Behaviors: Consumption of  "high calorie/ high fat foods, Consumption of high calorie beverages, Large portion sizes, Mindless eating, and Emotional eating  Food allergies or intolerances: No Known Allergies or intolerances  Cultural or Pentecostal considerations: None    Physical Assessment  Physical Activity  Types of exercise: Walking - stretches  Current physical limitations: None    Psychosocial Assessment   Support systems: relative(s)  Mom & Dad (had sleeve)  Friends - haven't told yet   Socioeconomic factors:   Employed at the [employer] (quitting in 2 weeks)  and at a [employer].   Nutrition Diagnosis  Diagnosis: Overweight / Obesity (NC-3.3)  Related to: Physical inactivity and Excessive energy intake  As Evidenced by: BMI >25     Nutrition Prescription: Recommend the following diet  Regular    Interventions and Teaching   Discussed pre-op and post-op nutrition guidelines.       Patient educated and handouts provided.  Surgical changes to stomach / GI  Capacity of post-surgery stomach  Diet progression  Adequate hydration  Sugar and fat restriction to decrease \"dumping syndrome\"  Fat restriction to decrease steatorrhea  Expected weight loss  Weight loss plateaus/ possibility of weight regain  Exercise  Suggestions for pre-op diet  Nutrition considerations after surgery  Protein supplements  Meal planning and preparation  Appropriate carbohydrate, protein, and fat intake, and food/fluid choices to maximize safe weight loss, nutrient intake, and tolerance   Dietary and lifestyle changes  Possible problems with poor eating habits  Intuitive eating  Techniques for self monitoring and keeping daily food journal  Potential for food intolerance after surgery, and ways to deal with them including: lactose intolerance, nausea, reflux, vomiting, diarrhea, food intolerance, appetite changes, gas  Vitamin / Mineral supplementation of Multivitamin with minerals, Calcium, Vitamin B12, Iron, Fat Soluble vitamins, and Vitamin D    Patient is not " currently pregnant and doesn't desire to become pregnant a minimum of one year post-op    Education provided to: patient    Barriers to learning: No barriers identified    Readiness to change: preparation    Prior research on procedure: internet, discussed with provider, and friends or family    Comprehension: verbalizes understanding     Expected Compliance: good  Recommendations  Pt is an appropriate candidate for surgery. Yes  Evaluation / Monitoring  Dietitian to Monitor: Eating pattern as discussed Tolerance of nutrition prescription Body weight Lab values Physical activity Bowel pattern  ReCheck Vitamin D    2/3 Weight Check Visit Summary 10/4/2024  Started process. Busy with school ([employer]) and works.   Reviewing manual and downloaded Baritastic.  Eating 3 meals - eating breakfast but knows she needs more consistency. B- Turkey Eller- Eng Muffin - OJ-  L- soups, sandwiches  D- Chicken and rice  Slowing pace - chewing food more. Hasn't started 30/60 - suggested ways to ease into habit. Water intake  - 32 oz Juice 8 oz  Stopped drinking soda. Advised protein drinks (chocolate) to curb craving during menstruation. Will also get pt familiar. Questions answered. Pt receptive   Workflow reviewed:   Psych and/or D+A Clearance: N/A  PCP Letter: Needs PCP  Surgeon Appt: 5/17/24  EGD: completed 7/30/24 by GI  Cardiac Risk Assessment: scheduled 1/3/25  Sleep Studies: N/A (SB 4/8)  Blood work: Needs to complete- also vitamin D was low (9) as of 5/2024- to recheck to see if we need to treat  Nicotine test: N/A  Weight loss meds: N/A  Required weight checks: 3+1  2/3   Weight Loss: Not required, encouraged positive lifestyle changes.  Goals  Eliminate sugar sweetened beverages, Food journal, Exercise 30 minutes 5 times per week, Complete lession plans 1-6, Eat 3 meals per day, and Eliminate mindless snacking  Follow Pre-Surgery guidelines  > Trial Baritastic for food logging  > Establish regular meal pattern -  include fruits, vegetables and whole grains  > Avoid skipping meals- Can use protein drink as meal replacement  > Decrease portions  > Focus on protein - include lean protein at each meal and snack - Learn to eat protein first  > Limit processed foods, fast foods and dining away from home  > Include meal prepping  > Limit snacks - healthier choices and portion; avoid grazing  > Slow pace of eating and sip fluids  - practice 30/60 minute rule  > Reduce caffeine/ eliminate by day of surgery  > Eliminate carbonation by pre-op diet -   > ncrease water intake - 64 oz - decrease juice  > Increase physical activity/establish exercise regimen   > Start multi vitamin and Continue  Vitamin D 2000IU X2 daily until retested   Work on skills to cope with emotional eating/mindfull eating  Pre-op weight loss not required but advised not to gain weight  Glycemic Control: HgA1c 5.5 on 7/8/24  Other ReCheck Vitamin D - may need script - has been taking supplement OTC  F/U next month with bariatric provider      Time Spent:   30 minutes

## 2024-10-07 ENCOUNTER — APPOINTMENT (OUTPATIENT)
Dept: LAB | Facility: CLINIC | Age: 24
End: 2024-10-07
Payer: COMMERCIAL

## 2024-10-07 DIAGNOSIS — Z01.818 PRE-OP TESTING: ICD-10-CM

## 2024-10-07 DIAGNOSIS — E55.9 VITAMIN D DEFICIENCY: ICD-10-CM

## 2024-10-07 DIAGNOSIS — E66.01 OBESITY, CLASS III, BMI 40-49.9 (MORBID OBESITY) (HCC): ICD-10-CM

## 2024-10-15 ENCOUNTER — APPOINTMENT (OUTPATIENT)
Dept: LAB | Facility: HOSPITAL | Age: 24
End: 2024-10-15
Payer: COMMERCIAL

## 2024-10-15 LAB
ALBUMIN SERPL BCG-MCNC: 4.3 G/DL (ref 3.5–5)
ALP SERPL-CCNC: 49 U/L (ref 34–104)
ALT SERPL W P-5'-P-CCNC: 11 U/L (ref 7–52)
ANION GAP SERPL CALCULATED.3IONS-SCNC: 7 MMOL/L (ref 4–13)
AST SERPL W P-5'-P-CCNC: 17 U/L (ref 5–45)
BASOPHILS # BLD AUTO: 0.04 THOUSANDS/ΜL (ref 0–0.1)
BASOPHILS NFR BLD AUTO: 1 % (ref 0–1)
BILIRUB SERPL-MCNC: 0.42 MG/DL (ref 0.2–1)
BUN SERPL-MCNC: 9 MG/DL (ref 5–25)
CALCIUM SERPL-MCNC: 9 MG/DL (ref 8.4–10.2)
CHLORIDE SERPL-SCNC: 103 MMOL/L (ref 96–108)
CHOLEST SERPL-MCNC: 112 MG/DL
CO2 SERPL-SCNC: 27 MMOL/L (ref 21–32)
CREAT SERPL-MCNC: 0.82 MG/DL (ref 0.6–1.3)
EOSINOPHIL # BLD AUTO: 0.04 THOUSAND/ΜL (ref 0–0.61)
EOSINOPHIL NFR BLD AUTO: 1 % (ref 0–6)
ERYTHROCYTE [DISTWIDTH] IN BLOOD BY AUTOMATED COUNT: 13.3 % (ref 11.6–15.1)
EST. AVERAGE GLUCOSE BLD GHB EST-MCNC: 120 MG/DL
GLUCOSE P FAST SERPL-MCNC: 90 MG/DL (ref 65–99)
HBA1C MFR BLD: 5.8 %
HCT VFR BLD AUTO: 39 % (ref 36.5–46.1)
HDLC SERPL-MCNC: 54 MG/DL
HGB BLD-MCNC: 12.3 G/DL (ref 12–15.4)
IMM GRANULOCYTES # BLD AUTO: 0.01 THOUSAND/UL (ref 0–0.2)
IMM GRANULOCYTES NFR BLD AUTO: 0 % (ref 0–2)
LDLC SERPL CALC-MCNC: 52 MG/DL (ref 0–100)
LYMPHOCYTES # BLD AUTO: 2.31 THOUSANDS/ΜL (ref 0.6–4.47)
LYMPHOCYTES NFR BLD AUTO: 34 % (ref 14–44)
MCH RBC QN AUTO: 28.2 PG (ref 26.8–34.3)
MCHC RBC AUTO-ENTMCNC: 31.5 G/DL (ref 31.4–37.4)
MCV RBC AUTO: 89 FL (ref 82–98)
MONOCYTES # BLD AUTO: 0.48 THOUSAND/ΜL (ref 0.17–1.22)
MONOCYTES NFR BLD AUTO: 7 % (ref 4–12)
NEUTROPHILS # BLD AUTO: 3.97 THOUSANDS/ΜL (ref 1.85–7.62)
NEUTS SEG NFR BLD AUTO: 57 % (ref 43–75)
NONHDLC SERPL-MCNC: 58 MG/DL
NRBC BLD AUTO-RTO: 0 /100 WBCS
PLATELET # BLD AUTO: 278 THOUSANDS/UL (ref 149–390)
PMV BLD AUTO: 11.7 FL (ref 8.9–12.7)
POTASSIUM SERPL-SCNC: 4.1 MMOL/L (ref 3.5–5.3)
PROT SERPL-MCNC: 7.4 G/DL (ref 6.4–8.4)
RBC # BLD AUTO: 4.36 MILLION/UL (ref 3.88–5.12)
SODIUM SERPL-SCNC: 137 MMOL/L (ref 135–147)
TRIGL SERPL-MCNC: 32 MG/DL
TSH SERPL DL<=0.05 MIU/L-ACNC: 2.08 UIU/ML (ref 0.45–4.5)
WBC # BLD AUTO: 6.85 THOUSAND/UL (ref 4.31–10.16)

## 2024-10-15 PROCEDURE — 80061 LIPID PANEL: CPT

## 2024-10-15 PROCEDURE — 82652 VIT D 1 25-DIHYDROXY: CPT

## 2024-10-15 PROCEDURE — 85025 COMPLETE CBC W/AUTO DIFF WBC: CPT

## 2024-10-15 PROCEDURE — 36415 COLL VENOUS BLD VENIPUNCTURE: CPT

## 2024-10-15 PROCEDURE — 83036 HEMOGLOBIN GLYCOSYLATED A1C: CPT

## 2024-10-15 PROCEDURE — 80053 COMPREHEN METABOLIC PANEL: CPT

## 2024-10-15 PROCEDURE — 84443 ASSAY THYROID STIM HORMONE: CPT

## 2024-10-16 LAB — 1,25(OH)2D SERPL-MCNC: 65.7 PG/ML (ref 5–200)

## 2024-10-16 NOTE — RESULT ENCOUNTER NOTE
Please review pre-op labs with the patient thank you; HgbA1C should normalized s/p surgery and vitamin D levels look great

## 2024-11-08 ENCOUNTER — CLINICAL SUPPORT (OUTPATIENT)
Dept: BARIATRICS | Facility: CLINIC | Age: 24
End: 2024-11-08

## 2024-11-08 DIAGNOSIS — Z71.89 ENCOUNTER FOR PRE-BARIATRIC SURGERY COUNSELING AND EDUCATION: Primary | ICD-10-CM

## 2024-11-08 PROCEDURE — RECHECK

## 2024-11-08 NOTE — PROGRESS NOTES
3/3 weight check. Started back at [employer]. Has 2 in person classes and 2 virtual classes. Working at [employer]. Now more time to focus on school since she isn't working her second job. Has been planning meals ahead of time. Having 3 meals per day. Has been able to save more money with not going out to eat for meals. Drinking 32 oz of water, 20 oz juice, 10 oz herbal tea. Has been working on increasing her activity, has gone for a few walks- getting 5-6k steps daily. Has been more mindful to reduce mindless snacking.  Workflow reviewed:   Psych and/or D+A Clearance: N/A  PCP Letter: Needs PCP- new patient appointment 11/13 with Dennise Acosta  Support Group: No longer required, strongly encouraged  Surgeon Appt: 5/17/24  EGD: completed 7/30/24 by GI  Cardiac Risk Assessment: scheduled 12/30/24  Sleep Studies: N/A (SB 4/8)  Blood work: completed 10/15/24  Nicotine test: N/A  Weight loss meds: N/A  Required weight checks: 3+1  Weight Loss: Not required, encouraged positive lifestyle changes.  Janelle Quevedo LCSW

## 2024-11-13 ENCOUNTER — TELEPHONE (OUTPATIENT)
Age: 24
End: 2024-11-13

## 2024-11-13 NOTE — TELEPHONE ENCOUNTER
"[facility] Note     Patient Name: Janie Kaur  Encounter Date: 1/23/24     Have you been cared for by a [facility] Dermatologist in the last 3 years and, if so, which description applies to you?    Yes.  I have been here within the last 3 years, and my medical history has NOT changed since that time.  I am FEMALE/of child-bearing potential.    REVIEW OF SYSTEMS:  Have you recently had or currently have any of the following? No changes in my recent health.   PAST MEDICAL HISTORY:  Have you personally ever had or currently have any of the following?  If \"YES,\" then please provide more detail. No changes in my medical history.   HISTORY OF IMMUNOSUPPRESSION: Do you have a history of any of the following:  Systemic Immunosuppression such as Diabetes, Biologic or Immunotherapy, Chemotherapy, Organ Transplantation, Bone Marrow Transplantation?  No     Answering \"YES\" requires the addition of the dotphrase \"IMMUNOSUPPRESSED\" as the first diagnosis of the patient's visit.   FAMILY HISTORY:  Any \"first degree relatives\" (parent, brother, sister, or child) with the following?    No changes in my family's known health.   PATIENT EXPERIENCE:    Do you want the Dermatologist to perform a COMPLETE skin exam today including a clinical examination under the \"bra and underwear\" areas?  NO  If necessary, do we have your permission to call and leave a detailed message on your Preferred Phone number that includes your specific medical information?  Yes      Allergies   Allergen Reactions   • Gluten Meal - Food Allergy GI Intolerance     Other Reaction(s): bowel issues   • Benzoyl Peroxide Rash     Other reaction(s): itching, swelling  Other reaction(s): itching, swelling        Current Outpatient Medications:   •  amphetamine-dextroamphetamine (ADDERALL XR) 10 MG 24 hr capsule, , Disp: , Rfl:   •  Azelaic Acid (Finacea) 15 % FOAM, Apply topically once or twice a day to a face area., Disp: 50 g, Rfl: 5  •  Azelaic " Patient called states she is stuck in traffic,,,, she is on the way,,,she has been informed of the 15min window. She will try to get there in 15mins. Is aware appt maybe have to be trish if past 15mins. Pt knowledged.     Please let provider know. Thank you   Acid 15 % cream, Apply 1 Application topically daily SM5 Azelaic Acid 15%/Niacinamide 2% cream, Disp: 30 g, Rfl: 3  •  clindamycin (CLEOCIN T) 1 %, Apply 1 Pad topically 2 (two) times a day, Disp: 30 Pad, Rfl: 5  •  fluticasone (FLONASE) 50 mcg/act nasal spray, 1 spray into each nostril daily Using as needed, Disp: , Rfl:   •  Junel FE 1.5/30 1.5-30 MG-MCG tablet, ONE PILL DAILY, SKIP THE INACTIVE PILLS AND RESTART A NEW PACK IMMEDIATELY, Disp: , Rfl:   •  levothyroxine 50 mcg tablet, levothyroxine 50 mcg tablet, Disp: , Rfl:   •  spironolactone (ALDACTONE) 100 mg tablet, TAKE 1 TABLET BY MOUTH EVERY DAY, Disp: 90 tablet, Rfl: 1  •  clindamycin (CLEOCIN T) 1 % lotion, Apply topically 2 (two) times a day Apply topically to affected areas twice a day. (Patient not taking: Reported on 1/23/2024), Disp: 60 mL, Rfl: 5  •  Multiple Vitamin (MULTI-VITAMIN DAILY PO), Take by mouth (Patient not taking: Reported on 1/23/2024), Disp: , Rfl:           Whom besides the patient is providing clinical information about today's encounter?   NO ADDITIONAL HISTORIAN (patient alone provided history)    Physical Exam and Assessment/Plan by Diagnosis:    FOLLICULITIS     Physical Exam:  Anatomic Location Affected:  Buttock   Morphological Description:  Few follicular papule   Pertinent Positives:  Pertinent Negatives:     Additional History of Present Condition:  Patient reports much improvement with the 100 mg of Spironolactone.       Assessment and Plan:  Based on a thorough discussion of this condition and the management approach to it (including a comprehensive discussion of the known risks, side effects and potential benefits of treatment), the patient (family) agrees to implement the following specific plan:  Continue Spironolactone 100 mg daily.      What is folliculitis?  Folliculitis is the name given to a group of skin conditions in which there are inflamed hair follicles. The result is a tender red spot, often with a surface  pustule.  Folliculitis may be superficial or deep. It can affect anywhere there are hairs, including chest, back, buttocks, arms and legs. Acne and its variants are also types of folliculitis.     What causes folliculitis?  Folliculitis can be due to infection, occlusion (blockage), irritation and various skin diseases.     Folliculitis due to infection  To determine if folliculitis is due to an infection, swabs should be taken from the pustules for cytology and culture in the laboratory.     Bacteria  Bacterial folliculitis is commonly due to Staphylococcus aureus. If the infection involves the deep part of the follicle, it results in a painful boil. Recommended treatment includes careful hygiene, antiseptic cleanser or cream, antibiotic ointment, and/or oral antibiotics.     Spa pool folliculitis is due to infection with Pseudomonas aeruginosa, which thrives in inadequately chlorinated warm water. Gram negative folliculitis is a pustular facial eruption also due to infection with Pseudomonas aeruginosa or other similar organisms. When it appears, it usually follows tetracycline treatment of acne, but is quite rare.     Yeasts  The most common yeast to cause a folliculitis is Pityrosporum ovale, also known as Malassezia. Malassezia folliculitis (Pityrosporum folliculitis) is an itchy acne-like condition usually affecting the upper trunk of a young adult. Treatment includes avoiding moisturisers, stopping any antibiotics and topical antifungal or oral antifungal medication for several weeks.     Candida albicans can also provoke a folliculitis in skin folds (intertrigo) or in the beard area. It is treated with topical or oral antifungal agents.     Fungi  Ringworm of the scalp (tinea capitis) usually results in scaling and hair loss, but sometimes results in folliculitis. In [place], cat ringworm (Microsporum canis) is the commonest organism causing scalp fungal infection. Other fungi such as Trichophyton  tonsurans are increasingly reported. Treatment is with oral antifungal agents for several months.     Viral infections  Folliculitis may caused by herpes simplex virus. This tends to be tender, and resolves without treatment in around 10 days. Severe recurrent attacks may be treated with acyclovir and other antiviral agents.     Herpes zoster (the cause of shingles) may also present as folliculitis with painful pustules and crusted spots within a dermatome (an area of skin supplied by a single nerve). It is treated with hihg-dose acyclovir.  Molluscum contagiosum, common in young children, may also cause follicular umbilicated papules, usually clustered in and around a body fold. Molluscum may provoke dermatitis.     Parasitic infection  Folliculitis on the face or scalp of older or immunosuppressed adults may be due to colonisation by hair follicle mites (demodex). This is known as demodicosis.  The human infestation, scabies, often provokes folliculitis, as well as non-follicular papules, vesicles and pustules.     Folliculitis due to irritation from regrowing hairs  Folliculitis may arise as hairs regrow after shaving, waxing, electrolysis or plucking. Swabs taken from the pustules are sterile ie there is no growth of bacteria or other organisms. In the beard area irritant folliculitis is known as pseudofolliculitis barbae.  Irritant folliculitis is also common on the lower legs of women (shaving rash). It is frequently very itchy. Treatment is by stopping hair removal, and not beginning again for about three months after the folliculitis has settled. To prevent reoccurring irritant folliculitis, use a gentle hair removal method, such as a lady's electric razor. Avoid soap and apply plenty of shaving gel, if using a blade shaver.     Folliculitis due to contact reactions  Occlusion  Paraffin-based ointments, moisturisers, and adhesive plasters may all result in a sterile folliculitis. If a moisturiser is needed,  choose an oil-free product, as it is less likely to cause occlusion.     Chemicals  Coal tar, cutting oils and other chemicals may cause an irritant folliculitis. Avoid contact with the causative product.     Topical steroids  Overuse of topical steroids may produce a folliculitis. Perioral dermatitis is a facial folliculitis provoked by moisturisers and topical steroids. Perioral dermatitis is treated with tetracycline antibiotics for six weeks or so.     Folliculitis due to immunosuppression  Eosinophilic folliculitis is a specific type of folliculitis that may arise in some immune suppressed individuals such as those infected by human immunodeficiency virus (HIV) or those who have cancer.     Folliculitis due to drugs  Folliculitis may be due to drugs, particularly corticosteroids (steroid acne), androgens (male hormones), ACTH, lithium, isoniazid (INH), phenytoin and B-complex vitamins. Protein kinase inhibitors (epidermal growth factor receptor inhibitors) and targeted therapy for metastatic melanoma (vemurafenib, dabrafenib) nearly always result in folliculitis.     Folliculitis due to inflammatory skin diseases  Certain uncommon inflammatory skin diseases may cause permanent hair loss and scarring because of deep seated sterile folliculitis. These include:  Lichen planus  Discoid lupus erythematosus  Folliculitis decalvans  Folliculitis keloidalis      Treatment depends on the underlying condition and its severity. A skin biopsy is often necessary to establish the diagnosis.     Acne variants   Acne and acne-like or acneform disorders are also forms of folliculitis. These include:  Acne vulgaris  Nodulocystic acne  Rosacea  Scalp folliculitis  Chloracne     The follicular occlusion syndrome refers to:  Hidradenitis suppurativa (acne inversa)  Acne conglobata (a severe form of nodulocystic acne)  Dissecting cellulitis (perifolliculitis capitis abscedens et suffodiens)  Pilonidal sinus.     Treatment of the  acne variants may include topical therapy as well as long courses of tetracycline antibiotics, isotretinoin (vitamin-A derivative) and in women, antiandrogenic therapy.     Buttock folliculitis  Folliculitis affecting the buttocks is quite common and is often nonspecific, ie no specific cause is found. Buttock folliculitis is equally common in males and females.  Acute buttock folliculitis is usually bacterial in origin (like boils), resulting in red painful papules and pustules. It clears with antibiotics.  Chronic buttock folliculitis does not often cause significant symptoms but it can be very persistent. Although antiseptics, topical acne treatments, peeling agents such as alphahydroxy acids, long courses of oral antibiotics and isotretinoin can help buttock folliculitis, they are not always effective. Hair removal might be worth trying if the affected area is hairy. As regrowth of hair can make it worse, permanent hair reduction by laser or intense pulsed light (IPL) is best.    ROSACEA    Physical Exam:  Anatomic Location Affected:  Face  Morphological Description:        Additional History of Present Condition:  Patient reports improvement. Has been taking the Spironolactone 100 mg tablets daily. Also uses Clindamycin pledgets as needed.     Assessment and Plan:  Based on a thorough discussion of this condition and the management approach to it (including a comprehensive discussion of the known risks, side effects and potential benefits of treatment), the patient (family) agrees to implement the following specific plan:  Discussed laser treatment with Dr. Devi for broken blood vessels on the face.   Reordered Finacea foam for the face.     Rosacea is a chronic rash affecting the mid-face including the nose, cheeks, chin, forehead, and eyelids. The incidence is usually greatest between the ages of 30-60 years and is more common in people with fair skin. Common characteristics include redness, telangiectasias,  papules and pustules over affected areas. Rosacea may look similar to acne, but there is a lack of comedones. Occasionally the eyes may also be involved in ocular rosacea. In advanced disease, enlargement of the sebaceous glands in the nose, termed rhinophyma, may be present.     Rosacea results in red spots (papules) and sometimes pustules over the face, but unlike acne there are no blackheads, whiteheads, or cystic nodules. Patients often experience increased facial flushing with prominent blood vessels (erythematotelangiectatic rosacea) and dry, sensitive skin. These symptoms are exacerbated by sun exposure, hot or spicy foods, topical steroids and oil-based facial products.     In ocular rosacea, eyelids may be red and sore due to conjunctivitis, keratitis, and episcleritis. If rhinophyma develops due to enlargement of sebaceous glands, the patient may have an enlarged and irregularly shaped nose with prominent pores. In rosacea that is refractory to treatment, patients can develop persistent redness and swelling of the face due to lymphatic obstruction (Morbihan disease).     Distribution around the cheeks may be confused with the malar or “butterfly rash” of lupus. However, the rash of lupus spares the nasal creases and lacks papules and pustules. If signs of photosensitivity, oral ulcers, arthritis, and kidney dysfunction are present then consider referral to a rheumatologist.     There are many potential causes of rosacea including genetic, environmental, vascular, and inflammatory factors. These include, but are not limited to:  Chronic exposure to ultraviolet radiation   Increased immune responses in the form of cathelicidins that promote vessel dilation and infiltration with white blood cells (neutrophils) into the dermis  Increased matrix metalloproteinases such as collagen and elastase that remodel normal tissue may contribute to inflammation of the skin making it thicker and harder  There is some  "evidence to suggest that increased numbers of demodex mites on patient skin may contribute to rosacea papules     General Treatment Approach   Avoid exacerbating factors such as heat, spicy foods, and alcohol   Use daily SPF30+ sunscreen and other methods of coverage for sun protection  Use water-based make-up   Avoid applying topical steroids to affected areas as they can cause perioral dermatitis and exacerbate rosacea     Topical Treatment Approach  Metronidazole cream or gel by itself or in combination with oral antibiotics for more severe cases  Azelaic acid cream or lotion is effective for mild inflammatory rosacea when applied twice daily to affected areas  Brimonidine gel and oxymetazoline hydrochloride cream can reduce facial redness temporarily   Ivermectin cream can treat papulopustular rosacea by controlling demodex mites and inflammation   Pimecrolimus cream or tacrolimus ointment twice a day for 2-3 months can help reduce inflammation    Oral Treatment Approach  Antibiotics such as doxycycline, minocycline, or erythromycin for 1-3 months  Clonidine and carvedilol can help reduce facial flushing and are generally well tolerated. Common side effects include low blood pressure, gastrointestinal upset, dry eyes, blurred vision and low heart rate.   Isotretinoin at low doses can be effective for long term treatment when antibiotics fail. Side effects may make it unsuitable for some patients.   NSAIDs such as diclofenac can help reduce discomfort and redness in the skin.     Procedural/Surgical Treatment Approach   Vascular lasers or intense pulsed light treatment may be used to treat persistent telangiectasia and papulopustular rosacea  Plastic surgery and carbon dioxide lasers may be used to treat rhinophyma     MELANOCYTIC NEVI (\"Moles\")    Physical Exam:  Anatomic Location Affected:   Mostly on sun-exposed areas of the Body  Morphological Description:  Scattered, 1-4mm round to ovoid, " "symmetrical-appearing, even bordered, skin colored to dark brown macules/papules, mostly in sun-exposed areas  Pertinent Positives:  Pertinent Negatives:    Additional History of Present Condition:  Present on exam    Assessment and Plan:  Based on a thorough discussion of this condition and the management approach to it (including a comprehensive discussion of the known risks, side effects and potential benefits of treatment), the patient (family) agrees to implement the following specific plan:  Monitor for changes     Melanocytic Nevi  Melanocytic nevi (\"moles\") are tan or brown, raised or flat areas of the skin which have an increased number of melanocytes. Melanocytes are the cells in our body which make pigment and account for skin color.    Some moles are present at birth (I.e., \"congenital nevi\"), while others come up later in life (i.e., \"acquired nevi\").  The sun can stimulate the body to make more moles.  Sunburns are not the only thing that triggers more moles.  Chronic sun exposure can do it too.     Clinically distinguishing a healthy mole from melanoma may be difficult, even for experienced dermatologists. The \"ABCDE's\" of moles have been suggested as a means of helping to alert a person to a suspicious mole and the possible increased risk of melanoma.  The suggestions for raising alert are as follows:    Asymmetry: Healthy moles tend to be symmetric, while melanomas are often asymmetric.  Asymmetry means if you draw a line through the mole, the two halves do not match in color, size, shape, or surface texture. Asymmetry can be a result of rapid enlargement of a mole, the development of a raised area on a previously flat lesion, scaling, ulceration, bleeding or scabbing within the mole.  Any mole that starts to demonstrate \"asymmetry\" should be examined promptly by a board certified dermatologist.     Border: Healthy moles tend to have discrete, even borders.  The border of a melanoma often blends " "into the normal skin and does not sharply delineate the mole from normal skin.  Any mole that starts to demonstrate \"uneven borders\" should be examined promptly by a board certified dermatologist.     Color: Healthy moles tend to be one color throughout.  Melanomas tend to be made up of different colors ranging from dark black, blue, white, or red.  Any mole that demonstrates a color change should be examined promptly by a board certified dermatologist.     Diameter: Healthy moles tend to be smaller than 0.6 cm in size; an exception are \"congenital nevi\" that can be larger.  Melanomas tend to grow and can often be greater than 0.6 cm (1/4 of an inch, or the size of a pencil eraser). This is only a guideline, and many normal moles may be larger than 0.6 cm without being unhealthy.  Any mole that starts to change in size (small to bigger or bigger to smaller) should be examined promptly by a board certified dermatologist.     Evolving: Healthy moles tend to \"stay the same.\"  Melanomas may often show signs of change or evolution such as a change in size, shape, color, or elevation.  Any mole that starts to itch, bleed, crust, burn, hurt, or ulcerate or demonstrate a change or evolution should be examined promptly by a board certified dermatologist.      Dysplastic Nevi  Dysplastic moles are moles that fit the ABCDE rules of melanoma but are not identified as melanomas when examined under the microscope.  They may indicate an increased risk of melanoma in that person. If there is a family history of melanoma, most experts agree that the person may be at an increased risk for developing a melanoma.  Experts still do not agree on what dysplastic moles mean in patients without a personal or family history of melanoma.  Dysplastic moles are usually larger than common moles and have different colors within it with irregular borders. The appearance can be very similar to a melanoma. Biopsies of dysplastic moles may show " "abnormalities which are different from a regular mole.      Melanoma  Malignant melanoma is a type of skin cancer that can be deadly if it spreads throughout the body. The incidence of melanoma in the [place] is growing faster than any other cancer. Melanoma usually grows near the surface of the skin for a period of time, and then begins to grow deeper into the skin. Once it grows deeper into the skin, the risk of spread to other organs greatly increases. Therefore, early detection and removal of a malignant melanoma may result in a better chance at a complete cure; removal after the tumor has spread may not be as effective, leading to worse clinical outcomes such as death.    The true rate of nevus transformation into a melanoma is unknown. It has been estimated that the lifetime risk for any acquired melanocytic nevus on any 20-year-old individual transforming into melanoma by age 80 is 0.03% (1 in 3,164) for men and 0.009% (1 in 10,800) for women.     The appearance of a \"new mole\" remains one of the most reliable methods for identifying a malignant melanoma.  Occasionally, melanomas appear as rapidly growing, blue-black, dome-shaped bumps within a previous mole or previous area of normal skin.  Other times, melanomas are suspected when a mole suddenly appears or changes. Itching, burning, or pain in a pigmented lesion should increase suspicion, but most patients with early melanoma have no skin discomfort whatsoever.  Melanoma can occur anywhere on the skin, including areas that are difficult for self-examination. Many melanomas are first noticed by other family members.  Suspicious-looking moles may be removed for microscopic examination.       You may be able to prevent death from melanoma by doing two simple things:    Try to avoid unnecessary sun exposure and protect your skin when it is exposed to the sun.  People who live near the equator, people who have intermittent exposures to large amounts of " "sun, and people who have had sunburns in childhood or adolescence have an increased risk for melanoma. Sun sense and vigilant sun protection may be keys to helping to prevent melanoma.  We recommend wearing UPF-rated sun protective clothing and sunglasses whenever possible and applying a moisturizer-sunscreen combination product (SPF 50+) such as Neutrogena Daily Defense to sun exposed areas of skin at least three times a day.    Have your moles regularly examined by a board certified dermatologist AND by yourself or a family member/friend at home.  We recommend that you have your moles examined at least once a year by a board certified dermatologist.  Use your birthday as an annual reminder to have your \"Birthday Suit\" (I.e., your skin) examined; it is a nice birthday gift to yourself to know that your skin is healthy appearing!  Additionally, at-home self examinations may be helpful for detecting a possible melanoma.  Use the ABCDEs we discussed and check your moles once a month at home.            Scribe Attestation    I,:  Jimmy Conroy am acting as a scribe while in the presence of the attending physician.:       I,:  Todd Singh MD personally performed the services described in this documentation    as scribed in my presence.:           "

## 2024-12-05 ENCOUNTER — OFFICE VISIT (OUTPATIENT)
Dept: BARIATRICS | Facility: CLINIC | Age: 24
End: 2024-12-05
Payer: COMMERCIAL

## 2024-12-05 VITALS
HEART RATE: 77 BPM | BODY MASS INDEX: 45.48 KG/M2 | HEIGHT: 67 IN | RESPIRATION RATE: 16 BRPM | WEIGHT: 289.8 LBS | SYSTOLIC BLOOD PRESSURE: 138 MMHG | DIASTOLIC BLOOD PRESSURE: 90 MMHG

## 2024-12-05 DIAGNOSIS — R73.03 PREDIABETES: ICD-10-CM

## 2024-12-05 DIAGNOSIS — E66.01 MORBID (SEVERE) OBESITY DUE TO EXCESS CALORIES (HCC): Primary | ICD-10-CM

## 2024-12-05 DIAGNOSIS — E66.01 OBESITY, CLASS III, BMI 40-49.9 (MORBID OBESITY) (HCC): ICD-10-CM

## 2024-12-05 PROBLEM — E66.813 OBESITY, CLASS III, BMI 40-49.9 (MORBID OBESITY): Status: ACTIVE | Noted: 2024-12-05

## 2024-12-05 PROCEDURE — 99213 OFFICE O/P EST LOW 20 MIN: CPT | Performed by: SURGERY

## 2024-12-05 NOTE — PROGRESS NOTES
"Progress Note - Bariatric Surgery   Kerry Madera 24 y.o. adult MRN: [id number]  Unit/Bed#:  Encounter: [id number]    Assessment/Plan:    Prediabetes  Hemoglobin A1c 5.8  Should improve after metabolic surgery    Morbid (severe) obesity due to excess calories (HCC)  Pt is a 24 year old female with class III morbid obesity with medical conditions caused and/or exacerbated by obesity including pre-diabetes. After review and reassessment of the patient and chart, I still deem it medically necessary that she undergo metabolic and bariatric surgery. More specifically, laparoscopic sleeve gastrectomy. Pt will be on our program mandated 800 calorie liver shrinking, very low calorie preoperative diet for 2-4 weeks prior to surgery.     Continue mindful eating, stress reduction, sleep hygiene  Continue increased activity  Continue RD/LCSW follow-up  Cardiology risk stratification pending      Subjective/Objective     Subjective: She is doing well.  She is eating mindfully.  She is increasing her activity.  Currently on break for school.    Review of systems: Negative except as noted above    Objective:     Blood pressure 138/90, pulse 77, resp. rate 16, height 5' 6.5\" (1.689 m), weight 131 kg (289 lb 12.8 oz).,Body mass index is 46.07 kg/m².      Invasive Devices       None                   Physical Exam:     No distress   EOMI   CN II-XII grossly intact  Neck ROM wnl   RRR  Breathing non labored   Abd flat, ND  Skin warm/dry  Msk ROM wnl   Thought content/behavior/mood wnl               Lab, Imaging and other studies:I have personally reviewed pertinent lab results.        "

## 2024-12-05 NOTE — ASSESSMENT & PLAN NOTE
Pt is a 24 year old female with class III morbid obesity with medical conditions caused and/or exacerbated by obesity including pre-diabetes. After review and reassessment of the patient and chart, I still deem it medically necessary that she undergo metabolic and bariatric surgery. More specifically, laparoscopic sleeve gastrectomy. Pt will be on our program mandated 800 calorie liver shrinking, very low calorie preoperative diet for 2-4 weeks prior to surgery.     Continue mindful eating, stress reduction, sleep hygiene  Continue increased activity  Continue RD/LCSW follow-up  Cardiology risk stratification pending

## 2024-12-10 ENCOUNTER — OFFICE VISIT (OUTPATIENT)
Dept: CARDIOLOGY CLINIC | Facility: MEDICAL CENTER | Age: 24
End: 2024-12-10
Payer: COMMERCIAL

## 2024-12-10 VITALS
BODY MASS INDEX: 45.99 KG/M2 | SYSTOLIC BLOOD PRESSURE: 116 MMHG | HEART RATE: 80 BPM | DIASTOLIC BLOOD PRESSURE: 76 MMHG | OXYGEN SATURATION: 100 % | WEIGHT: 293 LBS | HEIGHT: 67 IN

## 2024-12-10 DIAGNOSIS — E66.01 MORBID (SEVERE) OBESITY DUE TO EXCESS CALORIES (HCC): ICD-10-CM

## 2024-12-10 DIAGNOSIS — E66.01 OBESITY, CLASS III, BMI 40-49.9 (MORBID OBESITY) (HCC): ICD-10-CM

## 2024-12-10 DIAGNOSIS — E66.01 CLASS 3 SEVERE OBESITY DUE TO EXCESS CALORIES WITHOUT SERIOUS COMORBIDITY WITH BODY MASS INDEX (BMI) OF 40.0 TO 44.9 IN ADULT (HCC): Primary | ICD-10-CM

## 2024-12-10 DIAGNOSIS — E66.813 CLASS 3 SEVERE OBESITY DUE TO EXCESS CALORIES WITHOUT SERIOUS COMORBIDITY WITH BODY MASS INDEX (BMI) OF 40.0 TO 44.9 IN ADULT (HCC): Primary | ICD-10-CM

## 2024-12-10 DIAGNOSIS — R73.03 PREDIABETES: ICD-10-CM

## 2024-12-10 PROCEDURE — 99203 OFFICE O/P NEW LOW 30 MIN: CPT | Performed by: INTERNAL MEDICINE

## 2024-12-10 PROCEDURE — 93000 ELECTROCARDIOGRAM COMPLETE: CPT | Performed by: INTERNAL MEDICINE

## 2024-12-10 NOTE — PROGRESS NOTES
Cardiology Follow Up    Kerry Madera  2000  [id number]  [facility]  [address] [facility]  [place] [id number] [phone]    1. Class 3 severe obesity due to excess calories without serious comorbidity with body mass index (BMI) of 40.0 to 44.9 in adult ([facility])  2. Morbid (severe) obesity due to excess calories ([facility])  3. Obesity, Class III, BMI 40-49.9 (morbid obesity) ([facility])  4. Prediabetes      Discussion: There are no cardiac contraindications to anesthesia or surgery.    Cardiovascular History:   Ms. Madera was seen in 12/24 prior to bariatric surgery.  There is no history or symptoms of heart disease.  There is no history of chest discomfort or dyspnea with exertion.  There is no history of syncope.  She is relatively sedentary.  There is no history of hypertension, dyslipidemia or smoking.  She carries a diagnosis of pre-diabetes.  ECG showed first-degree AV block, otherwise no abnormalities.  There are no cardiac contraindications to anesthesia or surgery.    Patient Active Problem List   Diagnosis    Class 3 severe obesity due to excess calories without serious comorbidity with body mass index (BMI) of 40.0 to 44.9 in adult ([facility])    Morbid (severe) obesity due to excess calories ([facility])    Obesity, Class III, BMI 40-49.9 (morbid obesity) ([facility])    Prediabetes     Past Medical History:   Diagnosis Date    GERD (gastroesophageal reflux disease)      Social History     Socioeconomic History    Marital status: Single     Spouse name: Not on file    Number of children: Not on file    Years of education: Not on file    Highest education level: Not on file   Occupational History    Not on file   Tobacco Use    Smoking status: Never    Smokeless tobacco: Never   Vaping Use    Vaping status: Never Used   Substance and Sexual Activity    Alcohol use: Not Currently    Drug use: Never    Sexual activity: Not on file   Other Topics Concern     Not on file   Social History Narrative    Not on file     Social Drivers of Health     Financial Resource Strain: Not on file   Food Insecurity: Not on file   Transportation Needs: Not on file   Physical Activity: Not on file   Stress: Not on file   Social Connections: Not on file   Intimate Partner Violence: Not on file   Housing Stability: Not on file      Family History   Problem Relation Age of Onset    No Known Problems Mother     No Known Problems Father     No Known Problems Sister     No Known Problems Brother     No Known Problems Maternal Grandmother     No Known Problems Maternal Grandfather     No Known Problems Paternal Grandmother     No Known Problems Paternal Grandfather     No Known Problems Maternal Aunt     No Known Problems Maternal Uncle     No Known Problems Paternal Aunt     No Known Problems Paternal Uncle     No Known Problems Cousin      Past Surgical History:   Procedure Laterality Date    NO PAST SURGERIES         Current Outpatient Medications:     fluticasone (FLONASE) 50 mcg/act nasal spray, 2 sprays into each nostril if needed (Patient not taking: Reported on 12/5/2024), Disp: , Rfl:     omeprazole (PriLOSEC) 40 MG capsule, Take 1 capsule (40 mg total) by mouth daily, Disp: 30 capsule, Rfl: 2    polyethylene glycol (MIRALAX) 17 g packet, Take 17 g by mouth daily (Patient not taking: Reported on 12/5/2024), Disp: 10 each, Rfl: 0  No Known Allergies    Labs: personally reviewed all pertinent labs  Imaging:  personally reviewed all pertinent imaging  Cath:  ECHO:  Stress:  Holter:    Review of Systems:  Review of Systems   Constitutional: Negative.   HENT: Negative.     Eyes: Negative.    Cardiovascular: Negative.    Respiratory: Negative.     Endocrine: Negative.    Hematologic/Lymphatic: Negative.    Skin: Negative.    Musculoskeletal: Negative.    Gastrointestinal: Negative.    Genitourinary: Negative.    Neurological: Negative.    Psychiatric/Behavioral: Negative.      Allergic/Immunologic: Negative.    All other systems reviewed and are negative.      Vitals:    12/10/24 1010   BP: 116/76   Pulse: 80   SpO2: 100%     Weight (last 2 days)       Date/Time Weight    12/10/24 1010 133 (293)            Physical Exam:  Physical Exam  Vitals reviewed.   Constitutional:       General: She is not in acute distress.     Appearance: She is well-developed. She is not diaphoretic.   HENT:      Head: Normocephalic and atraumatic.   Eyes:      General: No scleral icterus.     Conjunctiva/sclera: Conjunctivae normal.   Neck:      Vascular: No JVD.      Trachea: No tracheal deviation.   Cardiovascular:      Rate and Rhythm: Normal rate and regular rhythm.      Pulses: Intact distal pulses.      Heart sounds: Normal heart sounds. No murmur heard.     No friction rub. No gallop.   Pulmonary:      Effort: Pulmonary effort is normal. No respiratory distress.      Breath sounds: Normal breath sounds. No stridor. No wheezing or rales.   Chest:      Chest wall: No tenderness.   Abdominal:      General: Bowel sounds are normal. There is no distension.      Palpations: Abdomen is soft.      Tenderness: There is no abdominal tenderness.   Musculoskeletal:         General: No tenderness. Normal range of motion.      Cervical back: Normal range of motion and neck supple.   Skin:     General: Skin is warm and dry.      Findings: No erythema.   Neurological:      Mental Status: She is alert and oriented to person, place, and time.      Cranial Nerves: No cranial nerve deficit.      Coordination: Coordination normal.   Psychiatric:         Behavior: Behavior normal.         Thought Content: Thought content normal.         Judgment: Judgment normal.         Paulie Roach MD

## 2024-12-12 ENCOUNTER — CLINICAL SUPPORT (OUTPATIENT)
Dept: BARIATRICS | Facility: CLINIC | Age: 24
End: 2024-12-12

## 2024-12-12 VITALS — BODY MASS INDEX: 46.42 KG/M2 | WEIGHT: 292 LBS

## 2024-12-12 DIAGNOSIS — E66.01 OBESITY, CLASS III, BMI 40-49.9 (MORBID OBESITY) (HCC): Primary | ICD-10-CM

## 2024-12-12 PROCEDURE — RECHECK

## 2024-12-12 NOTE — PROGRESS NOTES
"Bariatric Nutrition Assessment Note- PreOp  Insurance: 3 required monthly weight checks +1 with surgeon  2/3 Today      Type of surgery    Vertical sleeve gastrectomy  Surgery Date: TBD  Surgeon: Consult with Dr. Talon adrian 05/17/2024      Nutrition Assessment   Kerry Madera  24 y.o.  adult     There were no vitals taken for this visit.    Initial Weight at Surgeon Consult: 289#   BMI: 45.9  Height: 5'6.5\"  Current Weight: 292   Eval Weight: 295.6#   BMI: 47  Wt with BMI of 25: 157#  Pre-Op Excess Wt: 132#  BMI to Qualify at 40 = 251.5#  BMI to Qualify at 35 = 220#  PMH includes:  Obesity    Pt advised not to gain weight during preop process. Pt encouraged to lose weight via healthy eating and exercise. Pt may follow Liver Shrinking diet 2 weeks or more  prior to DOS depending on BMI at time. This diet will promote weight loss.    Nye- St. Jeor Equation:    [id number]  Weight Maintenance 2500  Estimated calories for weight loss 7815-1686 ( 1-2# per wk wt loss - sedentary )  Estimated protein needs (1.0-1.5 gms/kg IBW )   Estimated fluid needs 71-83 oz (30-35 ml/kg IBW )      NAFLD Fibrosis Score: -1.29 at 10/15/2024 11:39 AM  Calculated from:  SGOT/AST: 17 U/L at 10/15/2024 11:39 AM  SGPT/ALT: 11 U/L at 10/15/2024 11:39 AM  Serum Albumin: 4.3 g/dL at 10/15/2024 11:39 AM  Platelets: 278 Thousands/uL at 10/15/2024 11:39 AM  Age: 24 years  BMI: 47.4 at 10/4/2024 10:01 AM  Weight (recorded): 135.26 kg at 10/4/2024 10:01 AM  Height: 168.90 cm at 9/17/2024 10:17 AM  Has Diabetes: No    Weight History Reason for WLS:  Improve health   Onset of Obesity: Childhood  Family history of obesity: Yes  (Dad had sleeve)  Wt Loss Attempts: Counseling with  MD - GYN - ordered metformin  Exercise  Self Created Diets (Portion Control, Healthy Food Choices, etc.)  Patient has tried the above for 6 months or more with insufficient weight loss or weight regain, which is why patient has requested to be evaluated for weight loss " surgery today  Maximum Wt Lost: 10#      Review of History and Medications   OTC: Vitamin D  - 2000 IU X 2 day   Past Medical History:   Diagnosis Date    GERD (gastroesophageal reflux disease)      Past Surgical History:   Procedure Laterality Date    NO PAST SURGERIES       Social History     Socioeconomic History    Marital status: Single     Spouse name: Not on file    Number of children: Not on file    Years of education: Not on file    Highest education level: Not on file   Occupational History    Not on file   Tobacco Use    Smoking status: Never    Smokeless tobacco: Never   Vaping Use    Vaping status: Never Used   Substance and Sexual Activity    Alcohol use: Not Currently    Drug use: Never    Sexual activity: Not on file   Other Topics Concern    Not on file   Social History Narrative    Not on file     Social Drivers of Health     Financial Resource Strain: Not on file   Food Insecurity: Not on file   Transportation Needs: Not on file   Physical Activity: Not on file   Stress: Not on file   Social Connections: Not on file   Intimate Partner Violence: Not on file   Housing Stability: Not on file       Current Outpatient Medications:     fluticasone (FLONASE) 50 mcg/act nasal spray, 2 sprays into each nostril if needed (Patient not taking: Reported on 12/5/2024), Disp: , Rfl:     omeprazole (PriLOSEC) 40 MG capsule, Take 1 capsule (40 mg total) by mouth daily, Disp: 30 capsule, Rfl: 2    polyethylene glycol (MIRALAX) 17 g packet, Take 17 g by mouth daily (Patient not taking: Reported on 12/5/2024), Disp: 10 each, Rfl: 0  Food Intake and Lifestyle Assessment   Food Intake Assessment completed via usual diet recall  Breakfast: Skips  - sometimes cereal   Lunch: Soup or Fast Food  Dinner: Chicken rice - salad   Snack: Chips not regularly  Beverage intake: water 24 oz  , 2% milk, juice - 32 oz  regular soda (not daily), and Iced tea - 2 cups   Protein supplement: None - did in past   Portions:  6 oz  Protein   "1 c Starch   1/2c Vegetable    Estimated protein intake per day: 70-75  Estimated fluid intake per day: Inadequate   Meals eaten away from home: Frequent - most days   Typical meal pattern: 2-3  meals per day and 0-2 snacks per day  Eating Behaviors: Consumption of high calorie/ high fat foods, Consumption of high calorie beverages, Large portion sizes, Mindless eating, and Emotional eating  Food allergies or intolerances: No Known Allergies or intolerances  Cultural or Oriental orthodox considerations: None    Physical Assessment  Physical Activity  Types of exercise: Walking - stretches  Current physical limitations: None    Psychosocial Assessment   Support systems: relative(s)  Mom & Dad (had sleeve)  Friends - haven't told yet   Socioeconomic factors:   Employed at the [employer] (quitting in 2 weeks)  and at a [employer].   Nutrition Diagnosis  Diagnosis: Overweight / Obesity (NC-3.3)  Related to: Physical inactivity and Excessive energy intake  As Evidenced by: BMI >25     Nutrition Prescription: Recommend the following diet  Regular    Interventions and Teaching   Discussed pre-op and post-op nutrition guidelines.       Patient educated and handouts provided.  Surgical changes to stomach / GI  Capacity of post-surgery stomach  Diet progression  Adequate hydration  Sugar and fat restriction to decrease \"dumping syndrome\"  Fat restriction to decrease steatorrhea  Expected weight loss  Weight loss plateaus/ possibility of weight regain  Exercise  Suggestions for pre-op diet  Nutrition considerations after surgery  Protein supplements  Meal planning and preparation  Appropriate carbohydrate, protein, and fat intake, and food/fluid choices to maximize safe weight loss, nutrient intake, and tolerance   Dietary and lifestyle changes  Possible problems with poor eating habits  Intuitive eating  Techniques for self monitoring and keeping daily food journal  Potential for food intolerance after surgery, and ways to deal " with them including: lactose intolerance, nausea, reflux, vomiting, diarrhea, food intolerance, appetite changes, gas  Vitamin / Mineral supplementation of Multivitamin with minerals, Calcium, Vitamin B12, Iron, Fat Soluble vitamins, and Vitamin D    Patient is not currently pregnant and doesn't desire to become pregnant a minimum of one year post-op    Education provided to: patient    Barriers to learning: No barriers identified    Readiness to change: preparation    Prior research on procedure: internet, discussed with provider, and friends or family    Comprehension: verbalizes understanding     Expected Compliance: good  Recommendations  Pt is an appropriate candidate for surgery. Yes  Evaluation / Monitoring  Dietitian to Monitor: Eating pattern as discussed Tolerance of nutrition prescription Body weight Lab values Physical activity Bowel pattern  ReCheck Vitamin D    2/3 Weight Check Visit Summary 10/4/2024  Started process. 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   Reviewing manual and downloaded Baritastic.  Eating 3 meals - eating breakfast but knows she needs more consistency. B- Turkey Eller- Eng Muffin - OJ-  L- soups, sandwiches  D- Chicken and rice  Slowing pace - chewing food more. Hasn't started 30/60 - suggested ways to ease into habit. Water intake  - 32 oz Juice 8 oz  Stopped drinking soda. Advised protein drinks (chocolate) to curb craving during menstruation. Will also get pt familiar. Questions answered. Pt receptive     PreOp Monthly Visit Summary 12/12/2024  Completed workflow - Ready to submit. Discussed next steps of the pre op process. Admits she is nervous but excited. Has been busy with finals so not as focused this month. Plans to re-read manual and try protein drinks as semester ends. Has been working on eating 3 meals consistently and drinking more water as decreases juice intake.  Sedentary job but trying to walk at intervals during her shift. Also takes dog for walk    Questions/concerns addressed. Pt receptive  Workflow reviewed: Completed   Psych and/or D+A Clearance: N/A  PCP Letter: Needs PCP  Surgeon Appt: 5/17/24 - 4th Weight Check 12/5/2024  EGD: completed 7/30/24 by GI  Cardiac Risk Assessment: 12/10/2024  Sleep Studies: N/A (SB 4/8)  Blood work: Completed 10/15/24 Vitamin D now normal  Nicotine test: N/A  Weight loss meds: N/A  Required weight checks: 3+1   Weight Loss: Not required, encouraged positive lifestyle changes.  Goals  Eliminate sugar sweetened beverages, Food journal, Exercise 30 minutes 5 times per week, Complete lession plans 1-6, Eat 3 meals per day, and Eliminate mindless snacking  Follow Pre-Surgery guidelines  > Trial Baritastic for food logging  > Establish regular meal pattern - include fruits, vegetables and whole grains  > Avoid skipping meals- Can use protein drink as meal replacement  > Decrease portions  > Focus on protein - include lean protein at each meal and snack - Learn to eat protein first  > Limit processed foods, fast foods and dining away from home  > Include meal prepping  > Limit snacks - healthier choices and portion; avoid grazing  > Slow pace of eating and sip fluids  - practice 30/60 minute rule  > Reduce caffeine/ eliminate by day of surgery  > Eliminate carbonation by pre-op diet -   > ncrease water intake - 64 oz - decrease juice  > Increase physical activity/establish exercise regimen   > Start multi vitamin and Continue  Vitamin D 2000IU X2 daily until retested   Work on skills to cope with emotional eating/mindfull eating  Pre-op weight loss not required but advised not to gain weight  Glycemic Control: HgA1c 5.5 on 7/8/24  Other ReCheck Vitamin D - may need script - has been taking supplement OTC  F/U next month with bariatric provider - possibly scheduled for surgery      Time Spent:   30 minutes

## 2025-01-02 ENCOUNTER — TELEPHONE (OUTPATIENT)
Age: 25
End: 2025-01-02

## 2025-01-02 NOTE — TELEPHONE ENCOUNTER
Pt wants to reschedule her appt with Janelle today, it's a pre-op appt.  I tried the office but they were busy so I told the pt I would have someone give her a call back.

## 2025-01-14 NOTE — PROGRESS NOTES
Pre op. Patient has been approved for surgery- to meet with coordinator today. Going to be taking 3 classes next semester. Excited about her upcoming surgery. Has been having 3 meals per day consistently. Drinking 32 oz of water, 20 oz juice, herbal tea. Stopped soda. Has been more mindful of mindless snacking.   Using baritastic to track. Has added in some strength training.  Janelle Quevedo, WESLEYW

## 2025-01-15 ENCOUNTER — PREP FOR PROCEDURE (OUTPATIENT)
Dept: BARIATRICS | Facility: CLINIC | Age: 25
End: 2025-01-15

## 2025-01-15 ENCOUNTER — CLINICAL SUPPORT (OUTPATIENT)
Dept: BARIATRICS | Facility: CLINIC | Age: 25
End: 2025-01-15

## 2025-01-15 VITALS — BODY MASS INDEX: 45.92 KG/M2 | WEIGHT: 288.8 LBS

## 2025-01-15 DIAGNOSIS — E66.01 MORBID OBESITY (HCC): Primary | ICD-10-CM

## 2025-01-15 DIAGNOSIS — Z71.89 ENCOUNTER FOR PRE-BARIATRIC SURGERY COUNSELING AND EDUCATION: Primary | ICD-10-CM

## 2025-01-15 PROCEDURE — RECHECK

## 2025-02-05 ENCOUNTER — TELEPHONE (OUTPATIENT)
Dept: BARIATRICS | Facility: CLINIC | Age: 25
End: 2025-02-05

## 2025-02-05 NOTE — TELEPHONE ENCOUNTER
Left two messages about her pre op class tomorrow and the possibility of needing to change the date to 2/13 same time, because of the impending weather.

## 2025-02-06 ENCOUNTER — TELEPHONE (OUTPATIENT)
Dept: BARIATRICS | Facility: CLINIC | Age: 25
End: 2025-02-06

## 2025-02-06 NOTE — TELEPHONE ENCOUNTER
Spoke to patient and advised her that her appointment for today is cancelled due to the weather and that someone will contact her later today to reschedule.

## 2025-02-07 ENCOUNTER — OFFICE VISIT (OUTPATIENT)
Dept: BARIATRICS | Facility: CLINIC | Age: 25
End: 2025-02-07
Payer: COMMERCIAL

## 2025-02-07 VITALS
BODY MASS INDEX: 45.08 KG/M2 | DIASTOLIC BLOOD PRESSURE: 80 MMHG | WEIGHT: 287.2 LBS | HEART RATE: 72 BPM | RESPIRATION RATE: 16 BRPM | HEIGHT: 67 IN | SYSTOLIC BLOOD PRESSURE: 138 MMHG

## 2025-02-07 DIAGNOSIS — R73.03 PREDIABETES: ICD-10-CM

## 2025-02-07 DIAGNOSIS — Z01.818 OTHER SPECIFIED PRE-OPERATIVE EXAMINATION: Primary | ICD-10-CM

## 2025-02-07 DIAGNOSIS — E66.01 MORBID (SEVERE) OBESITY DUE TO EXCESS CALORIES (HCC): ICD-10-CM

## 2025-02-07 DIAGNOSIS — E66.01 OBESITY, CLASS III, BMI 40-49.9 (MORBID OBESITY) (HCC): ICD-10-CM

## 2025-02-07 PROCEDURE — 99213 OFFICE O/P EST LOW 20 MIN: CPT | Performed by: SURGERY

## 2025-02-07 RX ORDER — ACETAMINOPHEN 10 MG/ML
1000 INJECTION, SOLUTION INTRAVENOUS ONCE
OUTPATIENT
Start: 2025-02-07 | End: 2025-02-07

## 2025-02-07 RX ORDER — CELECOXIB 200 MG/1
200 CAPSULE ORAL ONCE
OUTPATIENT
Start: 2025-02-07 | End: 2025-02-07

## 2025-02-07 RX ORDER — ENOXAPARIN SODIUM 300 MG/3ML
40 INJECTION INTRAVENOUS; SUBCUTANEOUS ONCE
OUTPATIENT
Start: 2025-02-07 | End: 2025-02-07

## 2025-02-07 RX ORDER — ENOXAPARIN SODIUM 100 MG/ML
0.4 INJECTION SUBCUTANEOUS DAILY
Qty: 5.2 ML | Refills: 0 | Status: CANCELLED | OUTPATIENT
Start: 2025-02-07 | End: 2025-02-20

## 2025-02-07 NOTE — PROGRESS NOTES
H&P Exam - Bariatric Surgery   Kerry Madera 24 y.o. female MRN: [id number]  Unit/Bed#:  Encounter: [id number]      HPI:    24 y.o. yo female with morbid obesity found to be a good candidate to undergo a weight loss operation upon being enrolled here at the [facility].  She has been pre certified to undergo a Laparoscopic sleeve gastrectomy.  Here today to review her pre op test results.    Has been medically cleared for the procedure.    EGD IMPRESSION:  The esophagus appeared normal.  Mild edematous, erythematous mucosa in the antrum  The 1st part of the duodenum and 2nd part of the duodenum appeared normal.  Performed forceps biopsies in the greater curve of the stomach, lesser curve of the stomach, incisura, antrum, 1st part of the duodenum and 2nd part of the duodenum to rule out celiac disease and H. pylori       Chronic pain requiring opioids - (No)    I have discussed with her at length the risks and benefits of the operation and reiterated the components of our multidisciplinary program and the importance of compliance and follow up in the post operative period. Although there is a great statistical chance of improvement or even resolution of most of her associated comorbidities, the results vary from patient to patient and they largely depend on his commitment.     The patient was also instructed with regards to the importance of behavior modification, nutritional counseling, support meeting attendance and lifestyle changes that are important to ensure success.    She was given the opportunity to ask questions and I have answered all of them.    I have addressed with the patient the level of CODE STATUS for this hospital stay and after explaining the different options currently she wishes to be a Level I.   She understands and wishes to proceed.      Review of Systems   Gastrointestinal:         GERD   All other systems reviewed and are negative.      Historical Information   Past Medical  "History:   Diagnosis Date    GERD (gastroesophageal reflux disease)      Past Surgical History:   Procedure Laterality Date    NO PAST SURGERIES       Social History   Social History     Substance and Sexual Activity   Alcohol Use Not Currently     Social History     Substance and Sexual Activity   Drug Use Never     Social History     Tobacco Use   Smoking Status Never   Smokeless Tobacco Never     Family History: Family history non-contributory    Meds/Allergies   all medications and allergies reviewed  No Known Allergies    Objective     Current Vitals:   Blood Pressure: 138/80 (02/07/25 1017)  Pulse: 72 (02/07/25 1017)  Respirations: 16 (02/07/25 1017)  Height: 5' 6.5\" (168.9 cm) (02/07/25 1017)  Weight - Scale: 130 kg (287 lb 3.2 oz) (02/07/25 1017)      Invasive Devices       None                   Physical Exam  Constitutional:       Appearance: Normal appearance.   HENT:      Head: Atraumatic.      Nose: No rhinorrhea.   Eyes:      Extraocular Movements: Extraocular movements intact.   Cardiovascular:      Rate and Rhythm: Normal rate.   Pulmonary:      Effort: Pulmonary effort is normal. No respiratory distress.      Breath sounds: Normal breath sounds.   Abdominal:      General: Abdomen is flat. There is no distension.      Palpations: Abdomen is soft.      Tenderness: There is no abdominal tenderness.   Musculoskeletal:         General: Normal range of motion.      Cervical back: Normal range of motion.   Skin:     General: Skin is warm and dry.   Neurological:      General: No focal deficit present.      Mental Status: She is alert and oriented to person, place, and time.   Psychiatric:         Mood and Affect: Mood normal.         Behavior: Behavior normal.         Lab Results: I have personally reviewed pertinent lab results.    Imaging: Results Review Statement: I reviewed radiology reports from this admission including: EGD report.  EKG, Pathology, and Other Studies: Results Review Statement: I " reviewed radiology reports from this admission including: Cardiac risk stratification.    Code Status: Level 1    Assessment/Plan:    Obesity, Class III, BMI 40-49.9 (morbid obesity) (HCC)  24 y.o. yo female with morbid obesity found to be a good candidate to undergo a weight loss operation upon being enrolled here at the [facility]. She has completed all of the preoperative process for bariatric surgery.    - Plan for laparoscopic possible open sleeve gastrectomy with intraoperative EGD  - consent signed  - preop orders placed      Prediabetes  Continue mindful eating  Should improve after metabolic surgery

## 2025-02-07 NOTE — H&P (VIEW-ONLY)
H&P Exam - Bariatric Surgery   Kerry Madera 24 y.o. female MRN: [id number]  Unit/Bed#:  Encounter: [id number]      HPI:    24 y.o. yo female with morbid obesity found to be a good candidate to undergo a weight loss operation upon being enrolled here at the [facility].  She has been pre certified to undergo a Laparoscopic sleeve gastrectomy.  Here today to review her pre op test results.    Has been medically cleared for the procedure.    EGD IMPRESSION:  The esophagus appeared normal.  Mild edematous, erythematous mucosa in the antrum  The 1st part of the duodenum and 2nd part of the duodenum appeared normal.  Performed forceps biopsies in the greater curve of the stomach, lesser curve of the stomach, incisura, antrum, 1st part of the duodenum and 2nd part of the duodenum to rule out celiac disease and H. pylori       Chronic pain requiring opioids - (No)    I have discussed with her at length the risks and benefits of the operation and reiterated the components of our multidisciplinary program and the importance of compliance and follow up in the post operative period. Although there is a great statistical chance of improvement or even resolution of most of her associated comorbidities, the results vary from patient to patient and they largely depend on his commitment.     The patient was also instructed with regards to the importance of behavior modification, nutritional counseling, support meeting attendance and lifestyle changes that are important to ensure success.    She was given the opportunity to ask questions and I have answered all of them.    I have addressed with the patient the level of CODE STATUS for this hospital stay and after explaining the different options currently she wishes to be a Level I.   She understands and wishes to proceed.      Review of Systems   Gastrointestinal:         GERD   All other systems reviewed and are negative.      Historical Information   Past Medical  "History:   Diagnosis Date    GERD (gastroesophageal reflux disease)      Past Surgical History:   Procedure Laterality Date    NO PAST SURGERIES       Social History   Social History     Substance and Sexual Activity   Alcohol Use Not Currently     Social History     Substance and Sexual Activity   Drug Use Never     Social History     Tobacco Use   Smoking Status Never   Smokeless Tobacco Never     Family History: Family history non-contributory    Meds/Allergies   all medications and allergies reviewed  No Known Allergies    Objective     Current Vitals:   Blood Pressure: 138/80 (02/07/25 1017)  Pulse: 72 (02/07/25 1017)  Respirations: 16 (02/07/25 1017)  Height: 5' 6.5\" (168.9 cm) (02/07/25 1017)  Weight - Scale: 130 kg (287 lb 3.2 oz) (02/07/25 1017)      Invasive Devices       None                   Physical Exam  Constitutional:       Appearance: Normal appearance.   HENT:      Head: Atraumatic.      Nose: No rhinorrhea.   Eyes:      Extraocular Movements: Extraocular movements intact.   Cardiovascular:      Rate and Rhythm: Normal rate.   Pulmonary:      Effort: Pulmonary effort is normal. No respiratory distress.      Breath sounds: Normal breath sounds.   Abdominal:      General: Abdomen is flat. There is no distension.      Palpations: Abdomen is soft.      Tenderness: There is no abdominal tenderness.   Musculoskeletal:         General: Normal range of motion.      Cervical back: Normal range of motion.   Skin:     General: Skin is warm and dry.   Neurological:      General: No focal deficit present.      Mental Status: She is alert and oriented to person, place, and time.   Psychiatric:         Mood and Affect: Mood normal.         Behavior: Behavior normal.         Lab Results: I have personally reviewed pertinent lab results.    Imaging: Results Review Statement: I reviewed radiology reports from this admission including: EGD report.  EKG, Pathology, and Other Studies: Results Review Statement: I " reviewed radiology reports from this admission including: Cardiac risk stratification.    Code Status: Level 1    Assessment/Plan:    Obesity, Class III, BMI 40-49.9 (morbid obesity) (HCC)  24 y.o. yo female with morbid obesity found to be a good candidate to undergo a weight loss operation upon being enrolled here at the [facility]. She has completed all of the preoperative process for bariatric surgery.    - Plan for laparoscopic possible open sleeve gastrectomy with intraoperative EGD  - consent signed  - preop orders placed      Prediabetes  Continue mindful eating  Should improve after metabolic surgery

## 2025-02-07 NOTE — ASSESSMENT & PLAN NOTE
24 y.o. yo female with morbid obesity found to be a good candidate to undergo a weight loss operation upon being enrolled here at the [facility]. She has completed all of the preoperative process for bariatric surgery.    - Plan for laparoscopic possible open sleeve gastrectomy with intraoperative EGD  - consent signed  - preop orders placed

## 2025-02-13 ENCOUNTER — CLINICAL SUPPORT (OUTPATIENT)
Dept: BARIATRICS | Facility: CLINIC | Age: 25
End: 2025-02-13

## 2025-02-13 DIAGNOSIS — E66.01 OBESITY, CLASS III, BMI 40-49.9 (MORBID OBESITY) (HCC): Primary | ICD-10-CM

## 2025-02-13 PROCEDURE — RECHECK

## 2025-02-13 NOTE — PROGRESS NOTES
Weight Management Nutrition Class     Diagnosis: Obesity    Bariatric Surgeon: Dr. Hanley    Surgery: Vertical Sleeve Gastrectomy    Class: Pt attended pre-op education session. Standardized packet of information for bariatric surgery was provided and reviewed with pt. Importance of lifestyle change and development of regular exercise routine stressed.   Pt. educated on two-week pre operative liquid protein liver shrinking diet.  Pt understands that the diet needs to be followed for 2 weeks prior to surgery. Handout reviewed.   Emphasized the need to drink 80 ounces of fluid per day while on the diet and to contact PCP to adjust any diabetes or blood pressure medicines prior to starting the diet.  Patient will not eat any solid food for 2 days prior to surgery, including 2 cups of non starchy vegetables to ensure that the stomach will be empty day of surgery. Reviewed Ensure pre-surgery ERAS drink instructions, protein supplement suggestions, post-operative clear liquid, full liquid, and pureed diet stages, post-operative nutrition rules and facts, and post-operative bariatric multivitamin/mineral recommendations and brand comparison. Reviewed instructions for stopping or tapering anti-obesity medications prior to surgery.      Pt given the opportunity to ask questions. Questions were answered. Pt verbalized understanding of all information provided. Pt appeared prepared for upcoming surgery. Contact information provided for any questions/concerns.

## 2025-02-21 ENCOUNTER — TELEPHONE (OUTPATIENT)
Age: 25
End: 2025-02-21

## 2025-02-21 NOTE — TELEPHONE ENCOUNTER
Patient missed a call from [facility]. She is having surgery on Monday 2/24/25 with Dr. Gill and it was likely the preadmission office calling with instructions. Patient was given the main hospital number to call if she does not hear from preadmission by 8:00 p.m. this evening.

## 2025-02-21 NOTE — PRE-PROCEDURE INSTRUCTIONS
Pre-Surgery Instructions:   Medication Instructions    omeprazole (PriLOSEC) 20 mg delayed release capsule Take day of surgery.    polyethylene glycol (MIRALAX) 17 g packet Uses PRN- DO NOT take day of surgery    Medication instructions for day of surgery reviewed. Please take all instructed medications with only a sip of water.       You will receive a call one business day prior to surgery with an arrival time and hospital directions. If your surgery is scheduled on a Monday, the hospital will be calling you on the Friday prior to your surgery. If you have not heard from anyone by 8pm, please call the hospital supervisor through the hospital  at [phone]. ([place] [phone] or [place] [phone]).    Do not eat or drink anything after midnight the night before your surgery, including candy, mints, lifesavers, or chewing gum. Do not drink alcohol 24hrs before your surgery. Try not to smoke at least 24hrs before your surgery.       Follow the pre surgery showering instructions as listed in the “My Surgical Experience Booklet” or otherwise provided by your surgeon's office. Do not use a blade to shave the surgical area 1 week before surgery. It is okay to use a clean electric clippers up to 24 hours before surgery. Do not apply any lotions, creams, including makeup, cologne, deodorant, or perfumes after showering on the day of your surgery. Do not use dry shampoo, hair spray, hair gel, or any type of hair products.     No contact lenses, eye make-up, or artificial eyelashes. Remove nail polish, including gel polish, and any artificial, gel, or acrylic nails if possible. Remove all jewelry including rings and body piercing jewelry.     Wear causal clothing that is easy to take on and off. Consider your type of surgery.    Keep any valuables, jewelry, piercings at home. Please bring any specially ordered equipment (sling, braces) if indicated.    Arrange for a responsible person to drive you to and  from the hospital on the day of your surgery. Please confirm the visitor policy for the day of your procedure when you receive your phone call with an arrival time.     Call the surgeon's office with any new illnesses, exposures, or additional questions prior to surgery.    Please reference your “My Surgical Experience Booklet” for additional information to prepare for your upcoming surgery.

## 2025-02-23 ENCOUNTER — ANESTHESIA EVENT (OUTPATIENT)
Dept: PERIOP | Facility: HOSPITAL | Age: 25
End: 2025-02-23
Payer: COMMERCIAL

## 2025-02-23 NOTE — ANESTHESIA PREPROCEDURE EVALUATION
Procedure:  GASTRECTOMY SLEEVE LAPAROSCOPIC AND INTRAOPEARATIVE EGD (Abdomen)    Relevant Problems   ANESTHESIA (within normal limits)      CARDIO (within normal limits)   (-) Chest pain   (-) AARON (dyspnea on exertion)   (-) MI (myocardial infarction) (HCC)      ENDO (within normal limits)      GI/HEPATIC  NPO confirmed  BMI 43.7   (+) GERD (gastroesophageal reflux disease)      /RENAL (within normal limits)      HEMATOLOGY (within normal limits)      PULMONARY (within normal limits)   (-) Asthma   (-) Sleep apnea   (-) Smoking   (-) URI (upper respiratory infection)      Other   (+) Class 3 severe obesity due to excess calories without serious comorbidity with body mass index (BMI) of 40.0 to 44.9 in adult (HCC)   (+) Prediabetes      No Known Allergies    Social History     Tobacco Use    Smoking status: Never    Smokeless tobacco: Never   Vaping Use    Vaping status: Never Used   Substance Use Topics    Alcohol use: Not Currently    Drug use: Never     Current Outpatient Medications   Medication Instructions    fluticasone (FLONASE) 50 mcg/act nasal spray 2 sprays, As needed    omeprazole (PRILOSEC) 40 mg, Oral, Daily    omeprazole (PRILOSEC) 20 mg, Oral, Daily    polyethylene glycol (MIRALAX) 17 g, Oral, Daily     Lab Results   Component Value Date    WBC 6.85 10/15/2024    HGB 12.3 10/15/2024    HCT 39.0 10/15/2024     10/15/2024    SODIUM 137 10/15/2024    K 4.1 10/15/2024     10/15/2024    CO2 27 10/15/2024    BUN 9 10/15/2024    CREATININE 0.82 10/15/2024    HGBA1C 5.8 (H) 10/15/2024    AST 17 10/15/2024    ALT 11 10/15/2024    ALKPHOS 49 10/15/2024    TBILI 0.42 10/15/2024    ALB 4.3 10/15/2024     Vitals:    02/24/25 0902   BP: 133/90   Pulse: 65   Resp: 17   Temp: (!) 97.4 °F (36.3 °C)   SpO2: 100%     EKG 12/10/24  Sinus rhythm with first degree AVB. Otherwise normal.     Physical Exam    Airway    Mallampati score: I  TM Distance: >3 FB  Neck ROM: full     Dental   Comment: Denies  loose/chipped teeth, No notable dental hx     Cardiovascular  Rhythm: regular, Rate: normal, Cardiovascular exam normal    Pulmonary  Pulmonary exam normal Breath sounds clear to auscultation    Other Findings  post-pubertal.      Anesthesia Plan  ASA Score- 3     Anesthesia Type- general with ASA Monitors.         Additional Monitors:     Airway Plan: ETT.           Plan Factors-Exercise tolerance (METS): >4 METS.    Chart reviewed. EKG reviewed.  Existing labs reviewed. Patient summary reviewed.    Patient is not a current smoker.              Induction- intravenous.    Postoperative Plan- Plan for postoperative opioid use.     Perioperative Resuscitation Plan - Level 1 - Full Code.       Informed Consent- Anesthetic plan and risks discussed with patient and mother.      NPO Status:  Vitals Value Taken Time   Date of last liquid 02/24/25 02/24/25 0856   Time of last liquid 0700 02/24/25 0856   Date of last solid 02/23/25 02/24/25 0856   Time of last solid 0900 02/24/25 0856

## 2025-02-24 ENCOUNTER — ANESTHESIA (OUTPATIENT)
Dept: PERIOP | Facility: HOSPITAL | Age: 25
End: 2025-02-24
Payer: COMMERCIAL

## 2025-02-24 ENCOUNTER — HOSPITAL ENCOUNTER (OUTPATIENT)
Facility: HOSPITAL | Age: 25
Setting detail: OUTPATIENT SURGERY
Discharge: HOME | End: 2025-02-25
Attending: SURGERY | Admitting: SURGERY
Payer: COMMERCIAL

## 2025-02-24 VITALS
BODY MASS INDEX: 43.18 KG/M2 | RESPIRATION RATE: 19 BRPM | DIASTOLIC BLOOD PRESSURE: 78 MMHG | HEIGHT: 67 IN | WEIGHT: 275.13 LBS | SYSTOLIC BLOOD PRESSURE: 122 MMHG | OXYGEN SATURATION: 100 % | HEART RATE: 72 BPM | TEMPERATURE: 97.4 F

## 2025-02-24 DIAGNOSIS — R10.9 ABDOMINAL DISCOMFORT: ICD-10-CM

## 2025-02-24 DIAGNOSIS — E66.01 MORBID OBESITY (HCC): ICD-10-CM

## 2025-02-24 DIAGNOSIS — Z90.3 S/P GASTRIC SLEEVE PROCEDURE: ICD-10-CM

## 2025-02-24 DIAGNOSIS — L76.82 INCISIONAL PAIN: Primary | ICD-10-CM

## 2025-02-24 LAB
ATRIAL RATE: 72 BPM
EXT PREGNANCY TEST URINE: NEGATIVE
EXT. CONTROL: NORMAL
P AXIS: 48 DEGREES
PR INTERVAL: 178 MS
QRS AXIS: 36 DEGREES
QRSD INTERVAL: 74 MS
QT INTERVAL: 378 MS
QTC INTERVAL: 413 MS
T WAVE AXIS: 34 DEGREES
VENTRICULAR RATE: 72 BPM

## 2025-02-24 PROCEDURE — 93005 ELECTROCARDIOGRAM TRACING: CPT

## 2025-02-24 PROCEDURE — 88307 TISSUE EXAM BY PATHOLOGIST: CPT | Performed by: STUDENT IN AN ORGANIZED HEALTH CARE EDUCATION/TRAINING PROGRAM

## 2025-02-24 PROCEDURE — 81025 URINE PREGNANCY TEST: CPT | Performed by: SURGERY

## 2025-02-24 PROCEDURE — 93010 ELECTROCARDIOGRAM REPORT: CPT | Performed by: INTERNAL MEDICINE

## 2025-02-24 PROCEDURE — 43775 LAP SLEEVE GASTRECTOMY: CPT | Performed by: SURGERY

## 2025-02-24 PROCEDURE — 43775 LAP SLEEVE GASTRECTOMY: CPT | Performed by: PHYSICIAN ASSISTANT

## 2025-02-24 RX ORDER — SODIUM CHLORIDE, SODIUM LACTATE, POTASSIUM CHLORIDE, CALCIUM CHLORIDE 600; 310; 30; 20 MG/100ML; MG/100ML; MG/100ML; MG/100ML
INJECTION, SOLUTION INTRAVENOUS CONTINUOUS PRN
Status: DISCONTINUED | OUTPATIENT
Start: 2025-02-24 | End: 2025-02-24

## 2025-02-24 RX ORDER — SODIUM CHLORIDE, SODIUM LACTATE, POTASSIUM CHLORIDE, CALCIUM CHLORIDE 600; 310; 30; 20 MG/100ML; MG/100ML; MG/100ML; MG/100ML
100 INJECTION, SOLUTION INTRAVENOUS CONTINUOUS
Status: DISCONTINUED | OUTPATIENT
Start: 2025-02-24 | End: 2025-02-25 | Stop reason: HOSPADM

## 2025-02-24 RX ORDER — CELECOXIB 200 MG/1
200 CAPSULE ORAL ONCE
Status: COMPLETED | OUTPATIENT
Start: 2025-02-24 | End: 2025-02-24

## 2025-02-24 RX ORDER — DEXAMETHASONE SODIUM PHOSPHATE 10 MG/ML
INJECTION, SOLUTION INTRAMUSCULAR; INTRAVENOUS AS NEEDED
Status: DISCONTINUED | OUTPATIENT
Start: 2025-02-24 | End: 2025-02-24

## 2025-02-24 RX ORDER — PROPOFOL 10 MG/ML
INJECTION, EMULSION INTRAVENOUS AS NEEDED
Status: DISCONTINUED | OUTPATIENT
Start: 2025-02-24 | End: 2025-02-24

## 2025-02-24 RX ORDER — MAGNESIUM HYDROXIDE 1200 MG/15ML
LIQUID ORAL AS NEEDED
Status: DISCONTINUED | OUTPATIENT
Start: 2025-02-24 | End: 2025-02-24 | Stop reason: HOSPADM

## 2025-02-24 RX ORDER — PROMETHAZINE HYDROCHLORIDE 25 MG/ML
12.5 INJECTION, SOLUTION INTRAMUSCULAR; INTRAVENOUS ONCE AS NEEDED
Status: DISCONTINUED | OUTPATIENT
Start: 2025-02-24 | End: 2025-02-24 | Stop reason: HOSPADM

## 2025-02-24 RX ORDER — OXYCODONE HCL 5 MG/5 ML
10 SOLUTION, ORAL ORAL EVERY 4 HOURS PRN
Status: DISCONTINUED | OUTPATIENT
Start: 2025-02-24 | End: 2025-02-25 | Stop reason: HOSPADM

## 2025-02-24 RX ORDER — PROPOFOL 10 MG/ML
INJECTION, EMULSION INTRAVENOUS CONTINUOUS PRN
Status: DISCONTINUED | OUTPATIENT
Start: 2025-02-24 | End: 2025-02-24

## 2025-02-24 RX ORDER — FENTANYL CITRATE/PF 50 MCG/ML
25 SYRINGE (ML) INJECTION
Status: DISCONTINUED | OUTPATIENT
Start: 2025-02-24 | End: 2025-02-24 | Stop reason: HOSPADM

## 2025-02-24 RX ORDER — ONDANSETRON 2 MG/ML
4 INJECTION INTRAMUSCULAR; INTRAVENOUS ONCE AS NEEDED
Status: DISCONTINUED | OUTPATIENT
Start: 2025-02-24 | End: 2025-02-24 | Stop reason: HOSPADM

## 2025-02-24 RX ORDER — FENTANYL CITRATE/PF 50 MCG/ML
50 SYRINGE (ML) INJECTION
Status: DISCONTINUED | OUTPATIENT
Start: 2025-02-24 | End: 2025-02-24 | Stop reason: HOSPADM

## 2025-02-24 RX ORDER — ENOXAPARIN SODIUM 100 MG/ML
40 INJECTION SUBCUTANEOUS ONCE
Status: COMPLETED | OUTPATIENT
Start: 2025-02-24 | End: 2025-02-24

## 2025-02-24 RX ORDER — LORAZEPAM 2 MG/ML
0.5 INJECTION INTRAMUSCULAR EVERY 6 HOURS PRN
Status: DISCONTINUED | OUTPATIENT
Start: 2025-02-24 | End: 2025-02-25 | Stop reason: HOSPADM

## 2025-02-24 RX ORDER — BUPIVACAINE HYDROCHLORIDE 5 MG/ML
INJECTION, SOLUTION EPIDURAL; INTRACAUDAL AS NEEDED
Status: DISCONTINUED | OUTPATIENT
Start: 2025-02-24 | End: 2025-02-24 | Stop reason: HOSPADM

## 2025-02-24 RX ORDER — ACETAMINOPHEN 10 MG/ML
1000 INJECTION, SOLUTION INTRAVENOUS ONCE
Status: COMPLETED | OUTPATIENT
Start: 2025-02-24 | End: 2025-02-24

## 2025-02-24 RX ORDER — ACETAMINOPHEN 325 MG/1
1000 TABLET ORAL EVERY 8 HOURS SCHEDULED
Start: 2025-02-24 | End: 2025-03-03

## 2025-02-24 RX ORDER — OXYCODONE HCL 5 MG/5 ML
5 SOLUTION, ORAL ORAL EVERY 4 HOURS PRN
Status: DISCONTINUED | OUTPATIENT
Start: 2025-02-24 | End: 2025-02-25 | Stop reason: HOSPADM

## 2025-02-24 RX ORDER — LIDOCAINE HYDROCHLORIDE 10 MG/ML
INJECTION, SOLUTION EPIDURAL; INFILTRATION; INTRACAUDAL; PERINEURAL AS NEEDED
Status: DISCONTINUED | OUTPATIENT
Start: 2025-02-24 | End: 2025-02-24

## 2025-02-24 RX ORDER — FENTANYL CITRATE 50 UG/ML
INJECTION, SOLUTION INTRAMUSCULAR; INTRAVENOUS AS NEEDED
Status: DISCONTINUED | OUTPATIENT
Start: 2025-02-24 | End: 2025-02-24

## 2025-02-24 RX ORDER — POLYETHYLENE GLYCOL 3350 17 G/17G
17 POWDER, FOR SOLUTION ORAL AS NEEDED
Start: 2025-02-24 | End: 2025-02-25

## 2025-02-24 RX ORDER — PROMETHAZINE HYDROCHLORIDE 25 MG/ML
25 INJECTION, SOLUTION INTRAMUSCULAR; INTRAVENOUS EVERY 6 HOURS PRN
Status: DISCONTINUED | OUTPATIENT
Start: 2025-02-24 | End: 2025-02-25 | Stop reason: HOSPADM

## 2025-02-24 RX ORDER — SIMETHICONE 80 MG
80 TABLET,CHEWABLE ORAL EVERY 12 HOURS SCHEDULED
Status: DISCONTINUED | OUTPATIENT
Start: 2025-02-24 | End: 2025-02-25 | Stop reason: HOSPADM

## 2025-02-24 RX ORDER — KETOROLAC TROMETHAMINE 30 MG/ML
15 INJECTION, SOLUTION INTRAMUSCULAR; INTRAVENOUS EVERY 6 HOURS SCHEDULED
Status: DISCONTINUED | OUTPATIENT
Start: 2025-02-24 | End: 2025-02-25 | Stop reason: HOSPADM

## 2025-02-24 RX ORDER — HYDROMORPHONE HCL/PF 1 MG/ML
0.2 SYRINGE (ML) INJECTION
Status: DISCONTINUED | OUTPATIENT
Start: 2025-02-24 | End: 2025-02-24 | Stop reason: HOSPADM

## 2025-02-24 RX ORDER — SODIUM CHLORIDE 9 MG/ML
INJECTION INTRAVENOUS AS NEEDED
Status: DISCONTINUED | OUTPATIENT
Start: 2025-02-24 | End: 2025-02-24 | Stop reason: HOSPADM

## 2025-02-24 RX ORDER — HYDROMORPHONE HCL/PF 1 MG/ML
1 SYRINGE (ML) INJECTION EVERY 4 HOURS PRN
Status: DISCONTINUED | OUTPATIENT
Start: 2025-02-24 | End: 2025-02-25 | Stop reason: HOSPADM

## 2025-02-24 RX ORDER — ENOXAPARIN SODIUM 100 MG/ML
40 INJECTION SUBCUTANEOUS DAILY
Status: DISCONTINUED | OUTPATIENT
Start: 2025-02-25 | End: 2025-02-25 | Stop reason: HOSPADM

## 2025-02-24 RX ORDER — ACETAMINOPHEN 10 MG/ML
1000 INJECTION, SOLUTION INTRAVENOUS EVERY 8 HOURS SCHEDULED
Status: DISCONTINUED | OUTPATIENT
Start: 2025-02-24 | End: 2025-02-25 | Stop reason: HOSPADM

## 2025-02-24 RX ORDER — FAMOTIDINE 10 MG/ML
20 INJECTION, SOLUTION INTRAVENOUS EVERY 12 HOURS SCHEDULED
Status: DISCONTINUED | OUTPATIENT
Start: 2025-02-24 | End: 2025-02-25 | Stop reason: HOSPADM

## 2025-02-24 RX ORDER — DIPHENHYDRAMINE HCL 25 MG
25 TABLET ORAL
Status: DISCONTINUED | OUTPATIENT
Start: 2025-02-24 | End: 2025-02-25 | Stop reason: HOSPADM

## 2025-02-24 RX ORDER — ONDANSETRON 2 MG/ML
INJECTION INTRAMUSCULAR; INTRAVENOUS AS NEEDED
Status: DISCONTINUED | OUTPATIENT
Start: 2025-02-24 | End: 2025-02-24

## 2025-02-24 RX ORDER — ROCURONIUM BROMIDE 10 MG/ML
INJECTION, SOLUTION INTRAVENOUS AS NEEDED
Status: DISCONTINUED | OUTPATIENT
Start: 2025-02-24 | End: 2025-02-24

## 2025-02-24 RX ORDER — MIDAZOLAM HYDROCHLORIDE 2 MG/2ML
INJECTION, SOLUTION INTRAMUSCULAR; INTRAVENOUS AS NEEDED
Status: DISCONTINUED | OUTPATIENT
Start: 2025-02-24 | End: 2025-02-24

## 2025-02-24 RX ORDER — METOCLOPRAMIDE HYDROCHLORIDE 5 MG/ML
10 INJECTION INTRAMUSCULAR; INTRAVENOUS EVERY 6 HOURS PRN
Status: DISCONTINUED | OUTPATIENT
Start: 2025-02-24 | End: 2025-02-25 | Stop reason: HOSPADM

## 2025-02-24 RX ORDER — ONDANSETRON 2 MG/ML
4 INJECTION INTRAMUSCULAR; INTRAVENOUS EVERY 6 HOURS PRN
Status: DISCONTINUED | OUTPATIENT
Start: 2025-02-24 | End: 2025-02-25 | Stop reason: HOSPADM

## 2025-02-24 RX ADMIN — FENTANYL CITRATE 50 MCG: 50 INJECTION, SOLUTION INTRAMUSCULAR; INTRAVENOUS at 10:18

## 2025-02-24 RX ADMIN — LIDOCAINE HYDROCHLORIDE 100 MG: 10 INJECTION, SOLUTION EPIDURAL; INFILTRATION; INTRACAUDAL; PERINEURAL at 10:20

## 2025-02-24 RX ADMIN — SODIUM CHLORIDE, SODIUM LACTATE, POTASSIUM CHLORIDE, CALCIUM CHLORIDE: 600; 310; 30; 20 INJECTION, SOLUTION INTRAVENOUS at 10:22

## 2025-02-24 RX ADMIN — ONDANSETRON 4 MG: 2 INJECTION INTRAMUSCULAR; INTRAVENOUS at 11:36

## 2025-02-24 RX ADMIN — DEXMEDETOMIDINE HYDROCHLORIDE 8 MCG: 100 INJECTION, SOLUTION, CONCENTRATE INTRAVENOUS at 10:12

## 2025-02-24 RX ADMIN — ACETAMINOPHEN 1000 MG: 10 INJECTION INTRAVENOUS at 17:36

## 2025-02-24 RX ADMIN — FENTANYL CITRATE 25 MCG: 0.05 INJECTION, SOLUTION INTRAMUSCULAR; INTRAVENOUS at 13:47

## 2025-02-24 RX ADMIN — PROPOFOL 100 MCG/KG/MIN: 10 INJECTION, EMULSION INTRAVENOUS at 10:22

## 2025-02-24 RX ADMIN — ROCURONIUM BROMIDE 50 MG: 10 INJECTION, SOLUTION INTRAVENOUS at 10:20

## 2025-02-24 RX ADMIN — PROPOFOL 20 MG: 10 INJECTION, EMULSION INTRAVENOUS at 12:02

## 2025-02-24 RX ADMIN — KETOROLAC TROMETHAMINE 15 MG: 30 INJECTION, SOLUTION INTRAMUSCULAR; INTRAVENOUS at 17:36

## 2025-02-24 RX ADMIN — PROPOFOL 200 MG: 10 INJECTION, EMULSION INTRAVENOUS at 10:20

## 2025-02-24 RX ADMIN — FOSAPREPITANT 150 MG: 150 INJECTION, POWDER, LYOPHILIZED, FOR SOLUTION INTRAVENOUS at 09:13

## 2025-02-24 RX ADMIN — SIMETHICONE 80 MG: 80 TABLET, CHEWABLE ORAL at 16:03

## 2025-02-24 RX ADMIN — SUGAMMADEX 200 MG: 100 INJECTION, SOLUTION INTRAVENOUS at 11:37

## 2025-02-24 RX ADMIN — DEXMEDETOMIDINE HYDROCHLORIDE 8 MCG: 100 INJECTION, SOLUTION, CONCENTRATE INTRAVENOUS at 11:01

## 2025-02-24 RX ADMIN — SODIUM CHLORIDE, SODIUM LACTATE, POTASSIUM CHLORIDE, AND CALCIUM CHLORIDE: .6; .31; .03; .02 INJECTION, SOLUTION INTRAVENOUS at 10:15

## 2025-02-24 RX ADMIN — ENOXAPARIN SODIUM 40 MG: 40 INJECTION SUBCUTANEOUS at 09:07

## 2025-02-24 RX ADMIN — CELECOXIB 200 MG: 200 CAPSULE ORAL at 08:53

## 2025-02-24 RX ADMIN — DEXMEDETOMIDINE HYDROCHLORIDE 8 MCG: 100 INJECTION, SOLUTION, CONCENTRATE INTRAVENOUS at 10:38

## 2025-02-24 RX ADMIN — SODIUM CHLORIDE, SODIUM LACTATE, POTASSIUM CHLORIDE, AND CALCIUM CHLORIDE 100 ML/HR: .6; .31; .03; .02 INJECTION, SOLUTION INTRAVENOUS at 15:52

## 2025-02-24 RX ADMIN — ROCURONIUM BROMIDE 20 MG: 10 INJECTION, SOLUTION INTRAVENOUS at 11:18

## 2025-02-24 RX ADMIN — KETOROLAC TROMETHAMINE 15 MG: 30 INJECTION, SOLUTION INTRAMUSCULAR; INTRAVENOUS at 23:32

## 2025-02-24 RX ADMIN — PROPOFOL 60 MCG/KG/MIN: 10 INJECTION, EMULSION INTRAVENOUS at 09:31

## 2025-02-24 RX ADMIN — DEXAMETHASONE SODIUM PHOSPHATE 10 MG: 10 INJECTION INTRAMUSCULAR; INTRAVENOUS at 10:40

## 2025-02-24 RX ADMIN — CEFAZOLIN 3000 MG: 1 INJECTION, POWDER, FOR SOLUTION INTRAMUSCULAR; INTRAVENOUS at 10:20

## 2025-02-24 RX ADMIN — MIDAZOLAM HYDROCHLORIDE 2 MG: 1 INJECTION, SOLUTION INTRAMUSCULAR; INTRAVENOUS at 10:12

## 2025-02-24 RX ADMIN — FENTANYL CITRATE 50 MCG: 50 INJECTION, SOLUTION INTRAMUSCULAR; INTRAVENOUS at 11:41

## 2025-02-24 RX ADMIN — METOCLOPRAMIDE 10 MG: 5 INJECTION, SOLUTION INTRAMUSCULAR; INTRAVENOUS at 16:03

## 2025-02-24 RX ADMIN — PROPOFOL 30 MG: 10 INJECTION, EMULSION INTRAVENOUS at 12:06

## 2025-02-24 RX ADMIN — SIMETHICONE 80 MG: 80 TABLET, CHEWABLE ORAL at 23:32

## 2025-02-24 RX ADMIN — FAMOTIDINE 20 MG: 10 INJECTION, SOLUTION INTRAVENOUS at 17:36

## 2025-02-24 RX ADMIN — ACETAMINOPHEN 1000 MG: 10 INJECTION INTRAVENOUS at 09:17

## 2025-02-24 NOTE — OP NOTE
OPERATIVE REPORT  PATIENT NAME: Kerry Madera    :  2000  MRN: [id number]  Pt Location: [facility] OR ROOM 04    SURGERY DATE: 2025    Surgeons and Role:     * Margot Hanley MD - Primary     * Iqra Quiñones PA-C - Assisting     * Rancho Moya MD - Fellow    Preop Diagnosis:  Morbid obesity (HCC) [E66.01]    Post-Op Diagnosis Codes:     * Morbid obesity (HCC) [E66.01]    Procedure(s):  GASTRECTOMY SLEEVE LAPAROSCOPIC AND INTRAOPEARATIVE EGD    Specimen(s):  ID Type Source Tests Collected by Time Destination   1 : Portion of stomach for h pylori Tissue Stomach TISSUE EXAM Margot Hanley MD 2025 1114        Estimated Blood Loss:   20 ml    Drains:  * No LDAs found *    Anesthesia Type:   General    Operative Indications:  Morbid obesity (HCC) [E66.01]  Patient Active Problem List   Diagnosis    Class 3 severe obesity due to excess calories without serious comorbidity with body mass index (BMI) of 40.0 to 44.9 in adult (HCC)    Morbid (severe) obesity due to excess calories (HCC)    Obesity, Class III, BMI 40-49.9 (morbid obesity) (HCC)    Prediabetes    GERD (gastroesophageal reflux disease)     BMI 43.74 kg/m2    Operative Findings:  Normal anatomy       Complications:   None    Procedure and Technique:  The patient was identified by name, armband and conversation. The patient was then brought to the operative theatre. After successful induction of general anesthesia, the patient was prepped and draped in the usual sterile fashion. A timeout was performed and all were in agreement.  Veress needle was used to pre-insufflate the abdomen at the umbilicus. Optiview technique was used to gain entrance into the abdomen with a 5 mm 0 degree laparoscope at the umbilicust. The abdomen was then insufflated to 15 mmHg. A 5 mm port was placed near palmers point. A 12 mm port was placed in the right mid abdomen. A 5 mm epigastric liver retractor was placed. A 5 mm left lower quadrant port was placed. Four  quadrant Exparel/Marcaine transversus abdominus plane block was performed.  Gastoesophageal fat pad was resected. Starting 5 cm from the pylorus, the vessels along the greater curvature of the stomach were ligated and divided with the ultrasonic norma all the way to the gastroesophageal junction such that the left nila was exposed. A 36 [place] ViSiGi tube was then passed along the lesser curvature of the stomach toward the pylorus and was placed on suction.  Starting 5 cm from the pylorus, a series of firings with the Ethicon stapler using a gold load and the remainder blue with SLR was used to create the sleeve gastrectomy until a point 1 cm from the gastroesophageal junction was reached.   EGD was then performed. The endoscope was advanced past the posterior pharynx into the first portion of the duodenum. No twist, kinks or angulation of the incisura. The incisura was wide open. No staple line bleeding. The first portion of the duodenum appear normal. No apical outpouching.  The specimen was retrieved from the 12 mm port. All port sites were examined as we exited the abdomen to ensure hemostasis. The skin was then closed with monocryl and Exofin. All instrument, sponge and needle counts were correct.       I was present for the entire procedure., A qualified resident physician was not available., and A physician assistant was required during the procedure for retraction, tissue handling, dissection and suturing, traction/countertraction, stapling, camera driving and performance of the intraoperative EGD.    Patient Disposition:  PACU              SIGNATURE: Margot Hanley MD  DATE: February 24, 2025  TIME: 11:33 AM

## 2025-02-24 NOTE — ANESTHESIA POSTPROCEDURE EVALUATION
Post-Op Assessment Note    CV Status:  Stable    Pain management: adequate    Multimodal analgesia used between 6 hours prior to anesthesia start to PACU discharge    Mental Status:  Sleepy   Hydration Status:  Euvolemic   PONV Controlled:  Controlled   Airway Patency:  Patent     Post Op Vitals Reviewed: Yes    No anethesia notable event occurred.    Staff: Anesthesiologist           Last Filed PACU Vitals:  Vitals Value Taken Time   Temp 97.4 °F (36.3 °C) 02/24/25 1200   Pulse 65 02/24/25 1211   /61 02/24/25 1200   Resp 0 02/24/25 1211   SpO2 100 % 02/24/25 1211   Vitals shown include unfiled device data.    Modified Lexy:     Vitals Value Taken Time   Activity 2 02/24/25 1315   Respiration 2 02/24/25 1315   Circulation 2 02/24/25 1315   Consciousness 2 02/24/25 1315   Oxygen Saturation 2 02/24/25 1315     Modified Lexy Score: 10

## 2025-02-24 NOTE — DISCHARGE INSTR - AVS FIRST PAGE
Bariatric/Weight Loss Surgery  Hospital Discharge Instructions  ACTIVITY:  Progress as feels comfortable - a good rule is:  if you are doing something and it begins to hurt, stop doing the activity. Walk every hour while at home.  You may walk stairs if you do so slowly  You may shower 48 hours after surgery.  Use your incentive spirometer 10 times per hour while awake for 1 week  Do NOT drive for 48 hours after surgery. No driving 24 hours after taking certain prescription pain medications . Examples of such medication are Percocet, Darvocet, Oxycodone, Tylenol #3, and Tylenol with Codeine. Follow your pharmacist’s orders.    DIET  Stay on a liquid diet for 7 days after your surgery date, sipping slowly. Refer to your manual for examples of choices. Remember to keep your liquids sugar free or low calorie. You may have protein drinks. Make sure to drink 48 to 64 ounces per day of fluids.   You may advance to a pureed diet one week after surgery as instructed by your diet progression pamphlet. Once you get approval from your surgeon at your first post operative visit you may advance to the soft diet.     MEDICATIONS:  The abdominal nerve block will wear off during the first 1-2 days that you are home, and you may become sore. Continue to take your Tylenol and your pain medication as instructed.   Start vitamins and minerals per Sandra's instructions  Anti-acid Medication as per prescription.  Other medications as indicated on the Physician Patient Discharge Instructions form given to you at the time of discharge.  You will need to consult with your Family Doctor in regards to all your prescribed medication, particularly those for blood pressure and diabetes.  As you lose weight, medical conditions may change, requiring an alteration or elimination of the drug dose.   DO NOT TAKE BIRTH CONTROL(BC) MEDICATIONS, INSERT BC VAGINAL RINGS, OR PLACE IUD OR ANY OTHER BC METHODS UNTIL 31 DAYS FROM DAY OF DISCHARGE FROM  HOSPITAL. THIS PLACES YOU AT HIGH RISK FOR A POTENTIALLY LIFE THREATENING BLOOD CLOT. Remember to always use barrier methods for birth control and speak to your GYN about using two forms of birth control to start 31 days after surgery. It is very important to avoid pregnancy until at least 18-24 months after surgery.       INCISION CARE  You may shower and get incisions wet 2 days after surgery. No soaking tub baths or swimming for 30 days after surgery. Keep abdominal area and incisions clean. Use soap and water to create a good lather and rinse off. Do not scrub incisions.   If you have a drain, empty the drain as the nurses instructed.    FOLLOW-UP APPOINTMENT should be made for one week after discharge. Call surgeon’s office at [phone] to schedule an appointment.    CALL YOUR DOCTOR FOR:  pain not controlled by pain medications, a temperature greater than 101.5° F, any increase or change in drainage or redness from any incision, any vomiting or inability to keep liquids down, shortness of breath, shoulder pain, or bleeding

## 2025-02-24 NOTE — INTERVAL H&P NOTE
H&P reviewed. After examining the patient I find no changes in the patients condition since the H&P had been written.    Vitals:    02/24/25 0902   BP: 133/90   Pulse: 65   Resp: 17   Temp: (!) 97.4 °F (36.3 °C)   SpO2: 100%

## 2025-02-24 NOTE — NURSING NOTE
Patient thrashing and flailing upon arrival in [facility] at 1200..Anesthesia at bedside, given prop. 1214 patient awake and continue with the  thrashing and flailing. 4 nurses at bedside to help control patient.

## 2025-02-25 LAB
ANION GAP SERPL CALCULATED.3IONS-SCNC: 7 MMOL/L (ref 4–13)
BUN SERPL-MCNC: 5 MG/DL (ref 5–25)
CALCIUM SERPL-MCNC: 8.7 MG/DL (ref 8.4–10.2)
CHLORIDE SERPL-SCNC: 103 MMOL/L (ref 96–108)
CO2 SERPL-SCNC: 24 MMOL/L (ref 21–32)
CREAT SERPL-MCNC: 0.65 MG/DL (ref 0.6–1.3)
ERYTHROCYTE [DISTWIDTH] IN BLOOD BY AUTOMATED COUNT: 13.4 % (ref 11.6–15.1)
GFR SERPL CREATININE-BSD FRML MDRD: 125 ML/MIN/1.73SQ M
GLUCOSE SERPL-MCNC: 115 MG/DL (ref 65–140)
HCT VFR BLD AUTO: 34.2 % (ref 34.8–46.1)
HGB BLD-MCNC: 11.4 G/DL (ref 11.5–15.4)
MCH RBC QN AUTO: 29 PG (ref 26.8–34.3)
MCHC RBC AUTO-ENTMCNC: 33.3 G/DL (ref 31.4–37.4)
MCV RBC AUTO: 87 FL (ref 82–98)
PLATELET # BLD AUTO: 259 THOUSANDS/UL (ref 149–390)
PMV BLD AUTO: 11.4 FL (ref 8.9–12.7)
POTASSIUM SERPL-SCNC: 4.2 MMOL/L (ref 3.5–5.3)
RBC # BLD AUTO: 3.93 MILLION/UL (ref 3.81–5.12)
SODIUM SERPL-SCNC: 134 MMOL/L (ref 135–147)
WBC # BLD AUTO: 8.31 THOUSAND/UL (ref 4.31–10.16)

## 2025-02-25 PROCEDURE — 80048 BASIC METABOLIC PNL TOTAL CA: CPT | Performed by: PHYSICIAN ASSISTANT

## 2025-02-25 PROCEDURE — 99024 POSTOP FOLLOW-UP VISIT: CPT | Performed by: PHYSICIAN ASSISTANT

## 2025-02-25 PROCEDURE — NC001 PR NO CHARGE: Performed by: PHYSICIAN ASSISTANT

## 2025-02-25 PROCEDURE — 85027 COMPLETE CBC AUTOMATED: CPT | Performed by: PHYSICIAN ASSISTANT

## 2025-02-25 RX ORDER — BACLOFEN 10 MG/1
10 TABLET ORAL 3 TIMES DAILY
Qty: 21 TABLET | Refills: 0 | Status: SHIPPED | OUTPATIENT
Start: 2025-02-25 | End: 2025-03-04

## 2025-02-25 RX ORDER — BACLOFEN 10 MG/1
10 TABLET ORAL 3 TIMES DAILY
Status: DISCONTINUED | OUTPATIENT
Start: 2025-02-25 | End: 2025-02-25 | Stop reason: HOSPADM

## 2025-02-25 RX ORDER — ONDANSETRON 4 MG/1
4 TABLET, FILM COATED ORAL EVERY 8 HOURS PRN
Qty: 20 TABLET | Refills: 0 | Status: SHIPPED | OUTPATIENT
Start: 2025-02-25

## 2025-02-25 RX ORDER — POLYETHYLENE GLYCOL 3350 17 G/17G
17 POWDER, FOR SOLUTION ORAL AS NEEDED
Start: 2025-02-25

## 2025-02-25 RX ADMIN — ACETAMINOPHEN 1000 MG: 10 INJECTION INTRAVENOUS at 01:54

## 2025-02-25 RX ADMIN — KETOROLAC TROMETHAMINE 15 MG: 30 INJECTION, SOLUTION INTRAMUSCULAR; INTRAVENOUS at 06:41

## 2025-02-25 RX ADMIN — SIMETHICONE 80 MG: 80 TABLET, CHEWABLE ORAL at 10:36

## 2025-02-25 RX ADMIN — FAMOTIDINE 20 MG: 10 INJECTION, SOLUTION INTRAVENOUS at 08:14

## 2025-02-25 RX ADMIN — ENOXAPARIN SODIUM 40 MG: 40 INJECTION SUBCUTANEOUS at 08:14

## 2025-02-25 RX ADMIN — BACLOFEN 10 MG: 10 TABLET ORAL at 10:36

## 2025-02-25 RX ADMIN — ACETAMINOPHEN 1000 MG: 10 INJECTION INTRAVENOUS at 06:41

## 2025-02-25 RX ADMIN — ONDANSETRON 4 MG: 2 INJECTION INTRAMUSCULAR; INTRAVENOUS at 08:14

## 2025-02-25 RX ADMIN — SODIUM CHLORIDE, SODIUM LACTATE, POTASSIUM CHLORIDE, AND CALCIUM CHLORIDE 100 ML/HR: .6; .31; .03; .02 INJECTION, SOLUTION INTRAVENOUS at 01:55

## 2025-02-25 NOTE — ASSESSMENT & PLAN NOTE
Daily PPI  -Advised avoidance of food triggers and gastric irritants, follow anti-reflux diet and lifestyle measures

## 2025-02-25 NOTE — PLAN OF CARE
Problem: PAIN - ADULT  Goal: Verbalizes/displays adequate comfort level or baseline comfort level  Description: Interventions:  - Encourage patient to monitor pain and request assistance  - Assess pain using appropriate pain scale  - Administer analgesics based on type and severity of pain and evaluate response  - Implement non-pharmacological measures as appropriate and evaluate response  - Consider cultural and social influences on pain and pain management  - Notify physician/advanced practitioner if interventions unsuccessful or patient reports new pain  Outcome: Progressing     Problem: SAFETY ADULT  Goal: Patient will remain free of falls  Description: INTERVENTIONS:  - Educate patient/family on patient safety including physical limitations  - Instruct patient to call for assistance with activity   - Consult OT/PT to assist with strengthening/mobility   - Keep Call bell within reach  - Keep bed low and locked with side rails adjusted as appropriate  - Keep care items and personal belongings within reach  - Initiate and maintain comfort rounds  - Make Fall Risk Sign visible to staff  - Offer Toileting every 2 Hours, in advance of need  - Initiate/Maintain bed alarm  - Obtain necessary fall risk management equipment:   - Apply yellow socks and bracelet for high fall risk patients  - Consider moving patient to room near nurses station  Outcome: Progressing  Goal: Maintain or return to baseline ADL function  Description: INTERVENTIONS:  -  Assess patient's ability to carry out ADLs; assess patient's baseline for ADL function and identify physical deficits which impact ability to perform ADLs (bathing, care of mouth/teeth, toileting, grooming, dressing, etc.)  - Assess/evaluate cause of self-care deficits   - Assess range of motion  - Assess patient's mobility; develop plan if impaired  - Assess patient's need for assistive devices and provide as appropriate  - Encourage maximum independence but intervene and  supervise when necessary  - Involve family in performance of ADLs  - Assess for home care needs following discharge   - Consider OT consult to assist with ADL evaluation and planning for discharge  - Provide patient education as appropriate  Outcome: Progressing  Goal: Maintains/Returns to pre admission functional level  Description: INTERVENTIONS:  - Perform AM-PAC 6 Click Basic Mobility/ Daily Activity assessment daily.  - Set and communicate daily mobility goal to care team and patient/family/caregiver.   - Collaborate with rehabilitation services on mobility goals if consulted  - Perform Range of Motion 2 times a day.  - Reposition patient every 2 hours.  - Dangle patient 2 times a day  - Stand patient 2 times a day  - Ambulate patient 2 times a day  - Out of bed to chair 2 times a day   - Out of bed for meals 2 times a day  - Out of bed for toileting  - Record patient progress and toleration of activity level   Outcome: Progressing     Problem: DISCHARGE PLANNING  Goal: Discharge to home or other facility with appropriate resources  Description: INTERVENTIONS:  - Identify barriers to discharge w/patient and caregiver  - Arrange for needed discharge resources and transportation as appropriate  - Identify discharge learning needs (meds, wound care, etc.)  - Arrange for interpretive services to assist at discharge as needed  - Refer to Case Management Department for coordinating discharge planning if the patient needs post-hospital services based on physician/advanced practitioner order or complex needs related to functional status, cognitive ability, or social support system  Outcome: Progressing     Problem: Knowledge Deficit  Goal: Patient/family/caregiver demonstrates understanding of disease process, treatment plan, medications, and discharge instructions  Description: Complete learning assessment and assess knowledge base.  Interventions:  - Provide teaching at level of understanding  - Provide teaching via  preferred learning methods  Outcome: Progressing

## 2025-02-25 NOTE — PROGRESS NOTES
Progress Note - Bariatrics   Name: Kerry Madera 24 y.o. female I MRN: [id number]  Unit/Bed#: -01 I Date of Admission: 2/24/2025   Date of Service: 2/25/2025 I Hospital Day: 0     Assessment & Plan  Class 3 severe obesity due to excess calories without serious comorbidity with body mass index (BMI) of 40.0 to 44.9 in adult (HCC)  Assessment/Plan  23 yo F s/p lap sleeve gastrectomy POD1 with stable post op course. Encourage PO fluids, ambulation, and incentive spirometry.  Will plan for D/C home today    DVT Risk Calculator Score: 0.385% - No DVT prophylaxis necessary    Plan of care was discussed with patient and patient's nurse  Care plan discussed with Dr. Hanley    Dispo: Continue bariatric clear liquid diet, ambulation, incentive spirometry.     Prediabetes  Should resolve s/p surgery/weight loss  GERD (gastroesophageal reflux disease)  Daily PPI  -Advised avoidance of food triggers and gastric irritants, follow anti-reflux diet and lifestyle measures      Subjective   Mild gas pain. Tolerating liquid diet without nausea or vomiting, pain adequately controlled on oral pain medication, ambulating without assistance, voiding well, using incentive spirometer. Denies fevers, chills, sweats, SOB, CP, calf pain.      Objective :  Temp:  [97.4 °F (36.3 °C)] 97.4 °F (36.3 °C)  HR:  [64-91] 72  BP: ()/(50-90) 122/78  Resp:  [17-22] 19  SpO2:  [94 %-100 %] 100 %  O2 Device: None (Room air)    Physical Exam  Vitals reviewed.   Constitutional:       General: She is not in acute distress.     Appearance: She is well-developed.   HENT:      Head: Normocephalic and atraumatic.   Eyes:      General: No scleral icterus.  Cardiovascular:      Rate and Rhythm: Normal rate and regular rhythm.      Heart sounds: Normal heart sounds.   Pulmonary:      Effort: Pulmonary effort is normal. No respiratory distress.      Breath sounds: Normal breath sounds.   Abdominal:      General: Bowel sounds are normal. There is no  distension.      Palpations: Abdomen is soft.      Tenderness: There is no abdominal tenderness.   Skin:     General: Skin is warm and dry.   Neurological:      Mental Status: She is alert.   Psychiatric:         Mood and Affect: Mood normal.         Behavior: Behavior normal.     Lab Results: I have reviewed the following results:  Lab Results   Component Value Date    WBC 8.31 02/25/2025    HGB 11.4 (L) 02/25/2025    HCT 34.2 (L) 02/25/2025    MCV 87 02/25/2025     02/25/2025     Lab Results   Component Value Date    SODIUM 134 (L) 02/25/2025    K 4.2 02/25/2025     02/25/2025    CO2 24 02/25/2025    AGAP 7 02/25/2025    BUN 5 02/25/2025    CREATININE 0.65 02/25/2025    GLUC 115 02/25/2025    GLUF 90 10/15/2024    CALCIUM 8.7 02/25/2025    AST 17 10/15/2024    ALT 11 10/15/2024    ALKPHOS 49 10/15/2024    TP 7.4 10/15/2024    TBILI 0.42 10/15/2024    EGFR 125 02/25/2025     Lab Results   Component Value Date    HGBA1C 5.8 (H) 10/15/2024     Lab Results   Component Value Date    GIC9FOXBPNXA 2.082 10/15/2024    TSH 2.46 05/17/2024     Lab Results   Component Value Date    CHOLESTEROL 112 10/15/2024     Lab Results   Component Value Date    HDL 54 10/15/2024     Lab Results   Component Value Date    TRIG 32 10/15/2024     Lab Results   Component Value Date    LDLCALC 52 10/15/2024       Imaging Results Review: No pertinent imaging studies reviewed.  Other Study Results Review: No additional pertinent studies reviewed.    VTE Pharmacologic Prophylaxis: Enoxaparin (Lovenox)  VTE Mechanical Prophylaxis: sequential compression device

## 2025-02-25 NOTE — PLAN OF CARE
Problem: PAIN - ADULT  Goal: Verbalizes/displays adequate comfort level or baseline comfort level  Description: Interventions:  - Encourage patient to monitor pain and request assistance  - Assess pain using appropriate pain scale  - Administer analgesics based on type and severity of pain and evaluate response  - Implement non-pharmacological measures as appropriate and evaluate response  - Consider cultural and social influences on pain and pain management  - Notify physician/advanced practitioner if interventions unsuccessful or patient reports new pain  Outcome: Progressing     Problem: SAFETY ADULT  Goal: Patient will remain free of falls  Description: INTERVENTIONS:  - Educate patient/family on patient safety including physical limitations  - Instruct patient to call for assistance with activity   - Consult OT/PT to assist with strengthening/mobility   - Keep Call bell within reach  - Keep bed low and locked with side rails adjusted as appropriate  - Keep care items and personal belongings within reach  - Initiate and maintain comfort rounds  - Make Fall Risk Sign visible to staff  - Offer Toileting every 2 Hours, in advance of need  - Initiate/Maintain bed alarm  - Obtain necessary fall risk management equipment: call bell within reach   - Apply yellow socks and bracelet for high fall risk patients  - Consider moving patient to room near nurses station  Outcome: Progressing  Goal: Maintain or return to baseline ADL function  Description: INTERVENTIONS:  -  Assess patient's ability to carry out ADLs; assess patient's baseline for ADL function and identify physical deficits which impact ability to perform ADLs (bathing, care of mouth/teeth, toileting, grooming, dressing, etc.)  - Assess/evaluate cause of self-care deficits   - Assess range of motion  - Assess patient's mobility; develop plan if impaired  - Assess patient's need for assistive devices and provide as appropriate  - Encourage maximum independence  but intervene and supervise when necessary  - Involve family in performance of ADLs  - Assess for home care needs following discharge   - Consider OT consult to assist with ADL evaluation and planning for discharge  - Provide patient education as appropriate  Outcome: Progressing  Goal: Maintains/Returns to pre admission functional level  Description: INTERVENTIONS:  - Perform AM-PAC 6 Click Basic Mobility/ Daily Activity assessment daily.  - Set and communicate daily mobility goal to care team and patient/family/caregiver.   - Collaborate with rehabilitation services on mobility goals if consulted  - Perform Range of Motion 4 times a day.  - Reposition patient every 2 hours.  - Dangle patient 3 times a day  - Stand patient 3 times a day  - Ambulate patient 3 times a day  - Out of bed to chair 3 times a day   - Out of bed for meals 3 times a day  - Out of bed for toileting  - Record patient progress and toleration of activity level   Outcome: Progressing     Problem: DISCHARGE PLANNING  Goal: Discharge to home or other facility with appropriate resources  Description: INTERVENTIONS:  - Identify barriers to discharge w/patient and caregiver  - Arrange for needed discharge resources and transportation as appropriate  - Identify discharge learning needs (meds, wound care, etc.)  - Arrange for interpretive services to assist at discharge as needed  - Refer to Case Management Department for coordinating discharge planning if the patient needs post-hospital services based on physician/advanced practitioner order or complex needs related to functional status, cognitive ability, or social support system  Outcome: Progressing     Problem: Knowledge Deficit  Goal: Patient/family/caregiver demonstrates understanding of disease process, treatment plan, medications, and discharge instructions  Description: Complete learning assessment and assess knowledge base.  Interventions:  - Provide teaching at level of understanding  -  Provide teaching via preferred learning methods  Outcome: Progressing

## 2025-02-25 NOTE — ASSESSMENT & PLAN NOTE
Assessment/Plan  23 yo F s/p lap sleeve gastrectomy POD1 with stable post op course. Encourage PO fluids, ambulation, and incentive spirometry.  Will plan for D/C home today    DVT Risk Calculator Score: 0.385% - No DVT prophylaxis necessary    Plan of care was discussed with patient and patient's nurse  Care plan discussed with Dr. Hanley    Dispo: Continue bariatric clear liquid diet, ambulation, incentive spirometry.

## 2025-02-25 NOTE — DISCHARGE SUMMARY
"Discharge Summary - Bariatrics     Discharge Summary - Kerry Madera 24 y.o. female MRN: [id number]    Unit/Bed#: -01 Encounter: [id number]      Pre-Operative Diagnosis: Pre-Op Diagnosis Codes:      * Morbid obesity (HCC) [E66.01]    Post-Operative Diagnosis: Post-Op Diagnosis Codes:     * Morbid obesity (HCC) [E66.01]    Procedures Performed:  Procedure(s):  GASTRECTOMY SLEEVE LAPAROSCOPIC AND INTRAOPEARATIVE EGD    Surgeon: Margot Hanley MD    See H & P for full details of admission and Operative Note for full details of operations performed.     Hospital Course:  Patient was admitted for a Laparoscopic Sleeve Gastrectomy. Post operatively pain was controlled with oral analgesics and the patient is ambulating/micturating without difficulty. Vital signs and lab work were stable. The patient is tolerating clear liquid diet without nausea or vomiting. The patient is cleared for D/C by the surgeon on POD1.    Patient was seen and examined prior to discharge.      Provisions for Follow-Up Care:  See After Visit Summary for information related to follow-up care and home orders.      Disposition: Home, in stable condition. Patient should refer to \"Discharge Instructions\" for further information.    Planned Readmission: No    Discharge Medications:  See After Visit Summary for reconciled discharge medications provided to patient and family.      Post Operative instructions: Reviewed with patient and/or family.    This text is generated with voice recognition software. There may be translation, syntax,  or grammatical errors. If you have any questions, please contact the dictating provider.     Signature:   Iqra Quiñones PA-C  Date: 2/25/2025 Time: 9:17 AM     "

## 2025-02-26 ENCOUNTER — TELEPHONE (OUTPATIENT)
Dept: BARIATRICS | Facility: CLINIC | Age: 25
End: 2025-02-26

## 2025-02-26 PROCEDURE — 88307 TISSUE EXAM BY PATHOLOGIST: CPT | Performed by: STUDENT IN AN ORGANIZED HEALTH CARE EDUCATION/TRAINING PROGRAM

## 2025-02-26 NOTE — TELEPHONE ENCOUNTER
Post op follow up phone call completed.  Pt is sipping liquids but somewhat behind and knows she needs to increase.  using IS as instructed, reinforced importance of using IS to help prevent pneumonia. Ambulating about home without difficulty.  Minimal pain, not using oxycodone.  Reaffirmed examples of liquid diet over the next week.      She states she has a very small amount of fluid coming back up about 1-2 times per hour.  Mostly if she lies flat.  Asked her to sit upright in the day and use pillows to elevate head at night. Had a soft b/m last evening.    Pt stated understanding about discharge instructions and medication adjustments.      She only has 40mg omeprazole at home.  I asked her to take that for today and call her pharmacy to see if she can  the 20mg that was sent there from us on 2/7.  She will let us know if they won't fill it.     Follow up appt with surgeon scheduled for next week.   Instructed to call with any additional questions or concerns.

## 2025-02-27 ENCOUNTER — RESULTS FOLLOW-UP (OUTPATIENT)
Dept: BARIATRICS | Facility: CLINIC | Age: 25
End: 2025-02-27

## 2025-02-27 NOTE — TELEPHONE ENCOUNTER
Spoke with pt.  She was able to reach 32 oz yesterday and feels some of the gas pressure is better.  She's at 16 oz already for today .  Denies dizziness and says urine is light in color.     She's  going to call me early tomorrow morning with another update.

## 2025-02-28 DIAGNOSIS — K91.2 POSTSURGICAL MALABSORPTION: Primary | ICD-10-CM

## 2025-02-28 DIAGNOSIS — E86.0 MILD DEHYDRATION: ICD-10-CM

## 2025-02-28 NOTE — TELEPHONE ENCOUNTER
Pt called.  She is starting with cold symptoms.  We discussed which products she could take.  She reached about 32 oz intake yesterday.  With the addition of the cold symptoms, MATILDE Escalona decided to place infusion orders for her.  We discussed this and her first appt will be Monday the 3rd.  She knows that if she feels worse before that she can call or be evaluated at the ED.

## 2025-03-03 ENCOUNTER — HOSPITAL ENCOUNTER (OUTPATIENT)
Dept: INFUSION CENTER | Facility: CLINIC | Age: 25
Discharge: HOME | End: 2025-03-03
Payer: COMMERCIAL

## 2025-03-03 ENCOUNTER — TELEPHONE (OUTPATIENT)
Age: 25
End: 2025-03-03

## 2025-03-03 DIAGNOSIS — K91.2 POSTSURGICAL MALABSORPTION: Primary | ICD-10-CM

## 2025-03-03 DIAGNOSIS — E86.0 MILD DEHYDRATION: ICD-10-CM

## 2025-03-03 PROCEDURE — 96367 TX/PROPH/DG ADDL SEQ IV INF: CPT

## 2025-03-03 PROCEDURE — 96365 THER/PROPH/DIAG IV INF INIT: CPT

## 2025-03-03 RX ADMIN — ASCORBIC ACID, VITAMIN A PALMITATE, CHOLECALCIFEROL, THIAMINE HYDROCHLORIDE, RIBOFLAVIN-5 PHOSPHATE SODIUM, PYRIDOXINE HYDROCHLORIDE, NIACINAMIDE, DEXPANTHENOL, ALPHA-TOCOPHEROL ACETATE, VITAMIN K1, FOLIC ACID, BIOTIN, CYANOCOBALAMIN: 200; 3300; 200; 6; 3.6; 6; 40; 15; 10; 150; 600; 60; 5 INJECTION, SOLUTION INTRAVENOUS at 13:43

## 2025-03-03 RX ADMIN — ASCORBIC ACID, VITAMIN A PALMITATE, CHOLECALCIFEROL, THIAMINE HYDROCHLORIDE, RIBOFLAVIN-5 PHOSPHATE SODIUM, PYRIDOXINE HYDROCHLORIDE, NIACINAMIDE, DEXPANTHENOL, ALPHA-TOCOPHEROL ACETATE, VITAMIN K1, FOLIC ACID, BIOTIN, CYANOCOBALAMIN: 200; 3300; 200; 6; 3.6; 6; 40; 15; 10; 150; 600; 60; 5 INJECTION, SOLUTION INTRAVENOUS at 12:43

## 2025-03-03 NOTE — TELEPHONE ENCOUNTER
Pt called in to request an absence letter for her school and employer. Pt has surgery 02/24/25. Pt would like to receive the letters via email.

## 2025-03-03 NOTE — TELEPHONE ENCOUNTER
Pt of Dr. Gill s/p GASTRECTOMY SLEEVE LAPAROSCOPIC AND INTRAOPEARATIVE EGD (Abdomen) 2/24/25-  Pt asking questions about pureed. Reviewed notes and educated about food choices. Sent [employer] message.

## 2025-03-03 NOTE — PROGRESS NOTES
Pt here for bariatric hydration infusion, offering no complaints. Peripheral IV placed with positive blood return noted. Tolerated infusion without incident. Peripheral IV removed. AVS given to pt. Walked out in stable condition. Next appointment on 3/4/25 at 2pm.

## 2025-03-04 ENCOUNTER — HOSPITAL ENCOUNTER (OUTPATIENT)
Dept: INFUSION CENTER | Facility: CLINIC | Age: 25
Discharge: HOME | End: 2025-03-04

## 2025-03-04 NOTE — TELEPHONE ENCOUNTER
Pt has her first post op this week.  Once Dr Gill has seen her and given the ok for her return to work date, I will do the letter for her.

## 2025-03-06 NOTE — PROGRESS NOTES
Weight Management Nutrition Education    Diagnosis: Obesity    Bariatric Surgeon: Dr. Hanley    Surgery: Vertical Sleeve Gastrectomy    Class: first post op note    Topics discussed today include:     fluid goals post op, protein goals post op, constipation, chew food well, exercise, avoidance of alcohol, PPI use, diet progression, hypoglycemia, dumping syndrome, protein supplems, vitamin/mineral supplements, and calcium supplements    Patient was able to verbalize basic diet (protein, fluid, vitamin and mineral) recommendations and possible nutrition-related complications. Yes     Protein and Fluids adequate -  Getting infusions for hydration - may not need as po fluids better  Protein Drinks: Premier Protein - 1 to aim for the 2nd   Fluids: Approx 48 oz   Started puree diet and tolerating: Puree chicken - cottage & ricotta SF tomat sauce, Baby food cream of wheat, sweet potato, Greek Yogurt  Measuring 1/4 c:Yes   Following 30/60 rule: Yes   Eating slow and sipping fluids: Yes  Started chewable multivitamin and calcium: Bariatric Pal Chewable and 2 calcium chews  To start soft diet next week

## 2025-03-07 ENCOUNTER — CLINICAL SUPPORT (OUTPATIENT)
Dept: BARIATRICS | Facility: CLINIC | Age: 25
End: 2025-03-07

## 2025-03-07 ENCOUNTER — HOSPITAL ENCOUNTER (OUTPATIENT)
Dept: INFUSION CENTER | Facility: CLINIC | Age: 25
End: 2025-03-07
Payer: COMMERCIAL

## 2025-03-07 ENCOUNTER — OFFICE VISIT (OUTPATIENT)
Dept: BARIATRICS | Facility: CLINIC | Age: 25
End: 2025-03-07

## 2025-03-07 VITALS
HEIGHT: 67 IN | DIASTOLIC BLOOD PRESSURE: 80 MMHG | HEART RATE: 66 BPM | BODY MASS INDEX: 45.36 KG/M2 | TEMPERATURE: 97.7 F | OXYGEN SATURATION: 99 % | SYSTOLIC BLOOD PRESSURE: 122 MMHG | RESPIRATION RATE: 12 BRPM | WEIGHT: 289 LBS

## 2025-03-07 VITALS
SYSTOLIC BLOOD PRESSURE: 134 MMHG | RESPIRATION RATE: 16 BRPM | DIASTOLIC BLOOD PRESSURE: 60 MMHG | TEMPERATURE: 97.6 F | HEART RATE: 92 BPM

## 2025-03-07 VITALS — WEIGHT: 266 LBS | BODY MASS INDEX: 42.29 KG/M2

## 2025-03-07 DIAGNOSIS — K91.2 POSTSURGICAL MALABSORPTION: Primary | ICD-10-CM

## 2025-03-07 DIAGNOSIS — Z48.815 ENCOUNTER FOR SURGICAL AFTERCARE FOLLOWING SURGERY ON THE DIGESTIVE SYSTEM: ICD-10-CM

## 2025-03-07 DIAGNOSIS — E86.0 MILD DEHYDRATION: ICD-10-CM

## 2025-03-07 DIAGNOSIS — R73.03 PREDIABETES: ICD-10-CM

## 2025-03-07 PROCEDURE — 96366 THER/PROPH/DIAG IV INF ADDON: CPT

## 2025-03-07 PROCEDURE — 96365 THER/PROPH/DIAG IV INF INIT: CPT

## 2025-03-07 PROCEDURE — 99024 POSTOP FOLLOW-UP VISIT: CPT | Performed by: SURGERY

## 2025-03-07 PROCEDURE — RECHECK

## 2025-03-07 RX ADMIN — ASCORBIC ACID, VITAMIN A PALMITATE, CHOLECALCIFEROL, THIAMINE HYDROCHLORIDE, RIBOFLAVIN-5 PHOSPHATE SODIUM, PYRIDOXINE HYDROCHLORIDE, NIACINAMIDE, DEXPANTHENOL, ALPHA-TOCOPHEROL ACETATE, VITAMIN K1, FOLIC ACID, BIOTIN, CYANOCOBALAMIN: 200; 3300; 200; 6; 3.6; 6; 40; 15; 10; 150; 600; 60; 5 INJECTION, SOLUTION INTRAVENOUS at 14:28

## 2025-03-07 RX ADMIN — ASCORBIC ACID, VITAMIN A PALMITATE, CHOLECALCIFEROL, THIAMINE HYDROCHLORIDE, RIBOFLAVIN-5 PHOSPHATE SODIUM, PYRIDOXINE HYDROCHLORIDE, NIACINAMIDE, DEXPANTHENOL, ALPHA-TOCOPHEROL ACETATE, VITAMIN K1, FOLIC ACID, BIOTIN, CYANOCOBALAMIN: 200; 3300; 200; 6; 3.6; 6; 40; 15; 10; 150; 600; 60; 5 INJECTION, SOLUTION INTRAVENOUS at 15:28

## 2025-03-07 NOTE — ASSESSMENT & PLAN NOTE
Status post sleeve gastrectomy    Patient is presenting for the first postoperative visit, patient hospital stay was uneventful without any complications, patient is doing well, has no complaints, is taking vitamins as instructed, currently tolerating the blenderized diet, will advance to soft diet. The patient is also tolerating Lovenox injections and will be completing the remaining doses.    Patient will also be meeting with our dietician today to review vitamin and mineral supplements and also go over diet and emphasize postoperative commitment and compliance. The patient was also instructed to start exercising on a regular basis. However, I recommended no heavy lifting, or weight exercises for another 2 weeks. F/U in 4 weeks. Patient was instructed to call if develops nausea, vomiting, fever or chills. Pathology was also reviewed and was negative for H. pylori/metaplasia.

## 2025-03-07 NOTE — PROGRESS NOTES
Pt here for hydration, offering no complaints. PIV established with positive blood return noted. Tolerating infusion thus far. Report given to Carol VERA RN. Next appt 3/10 1pm.

## 2025-03-07 NOTE — PROGRESS NOTES
"FIRST POST-OPERATIVE VISIT - BARIATRIC SURGERY  Kerry Madera 24 y.o. female MRN: [id number]  Unit/Bed#:  Encounter: [id number]      HPI:  Kerry Madera is a 24 y.o. female who presents for follow up s/p laparoscopic sleeve gastrectomy.  Pain is well-controlled.  Tolerating a diet.    Amount of Oxycodone 5 mg tablets taken - (0 tablets; pain score-0)        Historical Information   Past Medical History:   Diagnosis Date    GERD (gastroesophageal reflux disease)      Past Surgical History:   Procedure Laterality Date    EGD      ND LAPS GSTRC RSTRICTIV PX LONGITUDINAL GASTRECTOMY N/A 2/24/2025    Procedure: GASTRECTOMY SLEEVE LAPAROSCOPIC AND INTRAOPEARATIVE EGD;  Surgeon: Margot Hanley MD;  Location: [facility];  Service: Bariatrics     Social History   Social History     Substance and Sexual Activity   Alcohol Use Not Currently     Social History     Substance and Sexual Activity   Drug Use Never     Social History     Tobacco Use   Smoking Status Never   Smokeless Tobacco Never     Family History: Family history non-contributory    Meds/Allergies   all medications and allergies reviewed  No Known Allergies    Objective     Current Vitals:   /80 (BP Location: Left arm, Patient Position: Sitting, Cuff Size: Large)   Pulse 66   Temp 97.7 °F (36.5 °C)   Resp 12   Ht 5' 6.5\" (1.689 m)   Wt 131 kg (289 lb)   LMP 02/16/2025 (Exact Date)   SpO2 99%   BMI 45.95 kg/m²     Invasive Devices       None                   Physical Exam  Cardiovascular:      Rate and Rhythm: Normal rate.   Pulmonary:      Effort: Pulmonary effort is normal.      Breath sounds: Normal breath sounds.   Abdominal:      General: Abdomen is flat. There is no distension.      Palpations: Abdomen is soft.      Tenderness: There is no abdominal tenderness.      Comments: Wounds clean/dry/intact without surrounding erythema   Neurological:      Mental Status: She is alert.         Assessment/Plan:    Encounter for surgical aftercare " following surgery on the digestive system  Status post sleeve gastrectomy    Patient is presenting for the first postoperative visit, patient hospital stay was uneventful without any complications, patient is doing well, has no complaints, is taking vitamins as instructed, currently tolerating the blenderized diet, will advance to soft diet. The patient is also tolerating Lovenox injections and will be completing the remaining doses.    Patient will also be meeting with our dietician today to review vitamin and mineral supplements and also go over diet and emphasize postoperative commitment and compliance. The patient was also instructed to start exercising on a regular basis. However, I recommended no heavy lifting, or weight exercises for another 2 weeks. F/U in 4 weeks. Patient was instructed to call if develops nausea, vomiting, fever or chills. Pathology was also reviewed and was negative for H. pylori/metaplasia.

## 2025-03-10 ENCOUNTER — HOSPITAL ENCOUNTER (OUTPATIENT)
Dept: INFUSION CENTER | Facility: CLINIC | Age: 25
Discharge: HOME | End: 2025-03-10
Payer: COMMERCIAL

## 2025-03-10 VITALS
HEART RATE: 68 BPM | SYSTOLIC BLOOD PRESSURE: 148 MMHG | RESPIRATION RATE: 20 BRPM | DIASTOLIC BLOOD PRESSURE: 75 MMHG | TEMPERATURE: 97.4 F

## 2025-03-10 DIAGNOSIS — K91.2 POSTSURGICAL MALABSORPTION: Primary | ICD-10-CM

## 2025-03-10 DIAGNOSIS — E86.0 MILD DEHYDRATION: ICD-10-CM

## 2025-03-10 PROCEDURE — 96365 THER/PROPH/DIAG IV INF INIT: CPT

## 2025-03-10 PROCEDURE — 96367 TX/PROPH/DG ADDL SEQ IV INF: CPT

## 2025-03-10 RX ADMIN — ASCORBIC ACID, VITAMIN A PALMITATE, CHOLECALCIFEROL, THIAMINE HYDROCHLORIDE, RIBOFLAVIN-5 PHOSPHATE SODIUM, PYRIDOXINE HYDROCHLORIDE, NIACINAMIDE, DEXPANTHENOL, ALPHA-TOCOPHEROL ACETATE, VITAMIN K1, FOLIC ACID, BIOTIN, CYANOCOBALAMIN: 200; 3300; 200; 6; 3.6; 6; 40; 15; 10; 150; 600; 60; 5 INJECTION, SOLUTION INTRAVENOUS at 15:16

## 2025-03-10 RX ADMIN — ONDANSETRON 4 MG: 2 INJECTION INTRAMUSCULAR; INTRAVENOUS at 13:41

## 2025-03-10 RX ADMIN — ASCORBIC ACID, VITAMIN A PALMITATE, CHOLECALCIFEROL, THIAMINE HYDROCHLORIDE, RIBOFLAVIN-5 PHOSPHATE SODIUM, PYRIDOXINE HYDROCHLORIDE, NIACINAMIDE, DEXPANTHENOL, ALPHA-TOCOPHEROL ACETATE, VITAMIN K1, FOLIC ACID, BIOTIN, CYANOCOBALAMIN: 200; 3300; 200; 6; 3.6; 6; 40; 15; 10; 150; 600; 60; 5 INJECTION, SOLUTION INTRAVENOUS at 14:06

## 2025-03-10 NOTE — PROGRESS NOTES
Pt here today for hydration, offers no complaints , infusion completed w/o complications, pt aware of their next appt on 3/13 at 12:30 AVS given and pt D/C home.

## 2025-03-13 ENCOUNTER — HOSPITAL ENCOUNTER (OUTPATIENT)
Dept: INFUSION CENTER | Facility: CLINIC | Age: 25
Discharge: HOME | End: 2025-03-13
Payer: COMMERCIAL

## 2025-03-13 VITALS
RESPIRATION RATE: 20 BRPM | TEMPERATURE: 97.9 F | DIASTOLIC BLOOD PRESSURE: 82 MMHG | SYSTOLIC BLOOD PRESSURE: 152 MMHG | HEART RATE: 80 BPM

## 2025-03-13 DIAGNOSIS — E86.0 MILD DEHYDRATION: ICD-10-CM

## 2025-03-13 DIAGNOSIS — K91.2 POSTSURGICAL MALABSORPTION: Primary | ICD-10-CM

## 2025-03-13 PROCEDURE — 96367 TX/PROPH/DG ADDL SEQ IV INF: CPT

## 2025-03-13 PROCEDURE — 96365 THER/PROPH/DIAG IV INF INIT: CPT

## 2025-03-13 RX ADMIN — ASCORBIC ACID, VITAMIN A PALMITATE, CHOLECALCIFEROL, THIAMINE HYDROCHLORIDE, RIBOFLAVIN-5 PHOSPHATE SODIUM, PYRIDOXINE HYDROCHLORIDE, NIACINAMIDE, DEXPANTHENOL, ALPHA-TOCOPHEROL ACETATE, VITAMIN K1, FOLIC ACID, BIOTIN, CYANOCOBALAMIN: 200; 3300; 200; 6; 3.6; 6; 40; 15; 10; 150; 600; 60; 5 INJECTION, SOLUTION INTRAVENOUS at 14:00

## 2025-03-13 RX ADMIN — ASCORBIC ACID, VITAMIN A PALMITATE, CHOLECALCIFEROL, THIAMINE HYDROCHLORIDE, RIBOFLAVIN-5 PHOSPHATE SODIUM, PYRIDOXINE HYDROCHLORIDE, NIACINAMIDE, DEXPANTHENOL, ALPHA-TOCOPHEROL ACETATE, VITAMIN K1, FOLIC ACID, BIOTIN, CYANOCOBALAMIN: 200; 3300; 200; 6; 3.6; 6; 40; 15; 10; 150; 600; 60; 5 INJECTION, SOLUTION INTRAVENOUS at 15:00

## 2025-03-13 RX ADMIN — ONDANSETRON 4 MG: 2 INJECTION INTRAMUSCULAR; INTRAVENOUS at 13:29

## 2025-03-13 NOTE — PROGRESS NOTES
Pt to clinic for IV hydration, offers no complaints today, tolerated infusion without complications, aware of next appointment on 3/17/25 at 2pm, PIV removed, avs printed and reviewed.

## 2025-03-18 ENCOUNTER — TELEPHONE (OUTPATIENT)
Dept: INFUSION CENTER | Facility: CLINIC | Age: 25
End: 2025-03-18

## 2025-03-18 NOTE — TELEPHONE ENCOUNTER
Attempted to call pts listed phone number several times and call was not able to be completed and one message said that the number was out of service. I did lvm on her mothers phone asking her to have pt call us back.

## 2025-03-18 NOTE — TELEPHONE ENCOUNTER
Patient has 2 no shows within a 6 month period.  Patient no showed on 3/4/25 and 3/17/25.  Infusion techs please contact patient and review the no show policy as well as her next scheduled infusion appt.

## 2025-03-24 ENCOUNTER — HOSPITAL ENCOUNTER (OUTPATIENT)
Dept: INFUSION CENTER | Facility: CLINIC | Age: 25
End: 2025-03-24

## 2025-03-25 ENCOUNTER — HOSPITAL ENCOUNTER (OUTPATIENT)
Dept: INFUSION CENTER | Facility: CLINIC | Age: 25
Discharge: HOME | End: 2025-03-25
Payer: COMMERCIAL

## 2025-03-25 VITALS
RESPIRATION RATE: 18 BRPM | HEART RATE: 70 BPM | SYSTOLIC BLOOD PRESSURE: 138 MMHG | TEMPERATURE: 98.4 F | DIASTOLIC BLOOD PRESSURE: 81 MMHG

## 2025-03-25 DIAGNOSIS — E86.0 MILD DEHYDRATION: ICD-10-CM

## 2025-03-25 DIAGNOSIS — K91.2 POSTSURGICAL MALABSORPTION: Primary | ICD-10-CM

## 2025-03-25 RX ADMIN — ASCORBIC ACID, VITAMIN A PALMITATE, CHOLECALCIFEROL, THIAMINE HYDROCHLORIDE, RIBOFLAVIN-5 PHOSPHATE SODIUM, PYRIDOXINE HYDROCHLORIDE, NIACINAMIDE, DEXPANTHENOL, ALPHA-TOCOPHEROL ACETATE, VITAMIN K1, FOLIC ACID, BIOTIN, CYANOCOBALAMIN: 200; 3300; 200; 6; 3.6; 6; 40; 15; 10; 150; 600; 60; 5 INJECTION, SOLUTION INTRAVENOUS at 12:48

## 2025-03-25 RX ADMIN — ASCORBIC ACID, VITAMIN A PALMITATE, CHOLECALCIFEROL, THIAMINE HYDROCHLORIDE, RIBOFLAVIN-5 PHOSPHATE SODIUM, PYRIDOXINE HYDROCHLORIDE, NIACINAMIDE, DEXPANTHENOL, ALPHA-TOCOPHEROL ACETATE, VITAMIN K1, FOLIC ACID, BIOTIN, CYANOCOBALAMIN: 200; 3300; 200; 6; 3.6; 6; 40; 15; 10; 150; 600; 60; 5 INJECTION, SOLUTION INTRAVENOUS at 13:48

## 2025-03-25 NOTE — PROGRESS NOTES
Pt presents for IV hydration, offering no complaints. Pt tolerated treatment without incident. Explained to patient she needs blood work done prior to next infusion, patient stated understanding. PIV removed. AVS declined, next appointment reviewed on 3/27 at 12pm.

## 2025-03-27 ENCOUNTER — HOSPITAL ENCOUNTER (OUTPATIENT)
Dept: INFUSION CENTER | Facility: CLINIC | Age: 25
Discharge: HOME | End: 2025-03-27
Payer: COMMERCIAL

## 2025-03-27 VITALS
TEMPERATURE: 97.4 F | SYSTOLIC BLOOD PRESSURE: 139 MMHG | HEART RATE: 80 BPM | DIASTOLIC BLOOD PRESSURE: 87 MMHG | RESPIRATION RATE: 18 BRPM

## 2025-03-27 DIAGNOSIS — E86.0 MILD DEHYDRATION: ICD-10-CM

## 2025-03-27 DIAGNOSIS — K91.2 POSTSURGICAL MALABSORPTION: Primary | ICD-10-CM

## 2025-03-27 PROCEDURE — 96365 THER/PROPH/DIAG IV INF INIT: CPT

## 2025-03-27 PROCEDURE — 96366 THER/PROPH/DIAG IV INF ADDON: CPT

## 2025-03-27 RX ADMIN — ASCORBIC ACID, VITAMIN A PALMITATE, CHOLECALCIFEROL, THIAMINE HYDROCHLORIDE, RIBOFLAVIN-5 PHOSPHATE SODIUM, PYRIDOXINE HYDROCHLORIDE, NIACINAMIDE, DEXPANTHENOL, ALPHA-TOCOPHEROL ACETATE, VITAMIN K1, FOLIC ACID, BIOTIN, CYANOCOBALAMIN: 200; 3300; 200; 6; 3.6; 6; 40; 15; 10; 150; 600; 60; 5 INJECTION, SOLUTION INTRAVENOUS at 12:43

## 2025-03-27 RX ADMIN — ASCORBIC ACID, VITAMIN A PALMITATE, CHOLECALCIFEROL, THIAMINE HYDROCHLORIDE, RIBOFLAVIN-5 PHOSPHATE SODIUM, PYRIDOXINE HYDROCHLORIDE, NIACINAMIDE, DEXPANTHENOL, ALPHA-TOCOPHEROL ACETATE, VITAMIN K1, FOLIC ACID, BIOTIN, CYANOCOBALAMIN: 200; 3300; 200; 6; 3.6; 6; 40; 15; 10; 150; 600; 60; 5 INJECTION, SOLUTION INTRAVENOUS at 13:43

## 2025-03-27 NOTE — PROGRESS NOTES
Pt presents to clinic for hydration. Pt offers no complaints. Tolerated infusion without complications. PIV removed. Pt aware of next appointment on 3/28/25 at 1330. AVS printed.

## 2025-03-28 ENCOUNTER — TELEPHONE (OUTPATIENT)
Dept: INFUSION CENTER | Facility: CLINIC | Age: 25
End: 2025-03-28

## 2025-03-28 NOTE — TELEPHONE ENCOUNTER
Pt called and said she will not be able to make appointment today. Pt reminded of no show policy due to cancellation being in a 24 hour period. Office made aware. Pharmacy made aware.

## 2025-03-31 ENCOUNTER — TELEPHONE (OUTPATIENT)
Dept: INFUSION CENTER | Facility: CLINIC | Age: 25
End: 2025-03-31

## 2025-03-31 ENCOUNTER — HOSPITAL ENCOUNTER (OUTPATIENT)
Dept: INFUSION CENTER | Facility: CLINIC | Age: 25
Discharge: HOME | End: 2025-03-31
Payer: COMMERCIAL

## 2025-03-31 VITALS
SYSTOLIC BLOOD PRESSURE: 120 MMHG | RESPIRATION RATE: 18 BRPM | TEMPERATURE: 97.6 F | DIASTOLIC BLOOD PRESSURE: 73 MMHG | HEART RATE: 61 BPM

## 2025-03-31 DIAGNOSIS — E86.0 MILD DEHYDRATION: ICD-10-CM

## 2025-03-31 DIAGNOSIS — K91.2 POSTSURGICAL MALABSORPTION: Primary | ICD-10-CM

## 2025-03-31 PROCEDURE — 96365 THER/PROPH/DIAG IV INF INIT: CPT

## 2025-03-31 PROCEDURE — 96366 THER/PROPH/DIAG IV INF ADDON: CPT

## 2025-03-31 RX ADMIN — ASCORBIC ACID, VITAMIN A PALMITATE, CHOLECALCIFEROL, THIAMINE HYDROCHLORIDE, RIBOFLAVIN-5 PHOSPHATE SODIUM, PYRIDOXINE HYDROCHLORIDE, NIACINAMIDE, DEXPANTHENOL, ALPHA-TOCOPHEROL ACETATE, VITAMIN K1, FOLIC ACID, BIOTIN, CYANOCOBALAMIN: 200; 3300; 200; 6; 3.6; 6; 40; 15; 10; 150; 600; 60; 5 INJECTION, SOLUTION INTRAVENOUS at 16:45

## 2025-03-31 RX ADMIN — ASCORBIC ACID, VITAMIN A PALMITATE, CHOLECALCIFEROL, THIAMINE HYDROCHLORIDE, RIBOFLAVIN-5 PHOSPHATE SODIUM, PYRIDOXINE HYDROCHLORIDE, NIACINAMIDE, DEXPANTHENOL, ALPHA-TOCOPHEROL ACETATE, VITAMIN K1, FOLIC ACID, BIOTIN, CYANOCOBALAMIN: 200; 3300; 200; 6; 3.6; 6; 40; 15; 10; 150; 600; 60; 5 INJECTION, SOLUTION INTRAVENOUS at 15:45

## 2025-03-31 NOTE — PROGRESS NOTES
Pt tolerated remaining infusion without complications. PIV flushed and removed. AVS declined. Walked out in stable condition.

## 2025-03-31 NOTE — PROGRESS NOTES
Patient to clinic for  Offers no complaints today.Port accessed with positive blood return noted throughout treatment.  Tolerating infusion without complications.  Aware of next appointment 4/1/25 at 1 pm.  Report given to Rosanne CASTELLANO

## 2025-03-31 NOTE — TELEPHONE ENCOUNTER
Patient has no showed 3 times within a 6 month period.  Patient no showed on 3/4/25, 3/17/25, and 3/28/25.  Infusion techs please contact the patient to review the no show policy as well as her next scheduled appt.

## 2025-04-02 ENCOUNTER — TELEPHONE (OUTPATIENT)
Dept: INFUSION CENTER | Facility: CLINIC | Age: 25
End: 2025-04-02

## 2025-04-02 NOTE — TELEPHONE ENCOUNTER
Called and spoke w/ pt, she is aware of No shows/policy and that future appts have been canceled and plan has been d/c'd. She knows to reach out to the providers office for any further assistance.

## 2025-04-02 NOTE — TELEPHONE ENCOUNTER
Patient has 4 no shows within a 6 month period.  Patient no showed on 3/4/25, 3/17/25, 3/28/25, and 4/1/25.  Infusion techs please ensure to contact the patient today and review the no show policy as well as her next scheduled appt. At this time she will be able to only scheduled 1 appt at a time due to appt non-compliance.  If she no shows a 5th time within a 6 month period she will be discharged from the infusion center.

## 2025-04-03 ENCOUNTER — HOSPITAL ENCOUNTER (OUTPATIENT)
Dept: INFUSION CENTER | Facility: CLINIC | Age: 25
End: 2025-04-03

## 2025-04-04 ENCOUNTER — HOSPITAL ENCOUNTER (OUTPATIENT)
Dept: INFUSION CENTER | Facility: CLINIC | Age: 25
Discharge: HOME | End: 2025-04-04

## (undated) DEVICE — GLOVE SRG BIOGEL ECLIPSE 7.5

## (undated) DEVICE — 4-PORT MANIFOLD: Brand: NEPTUNE 2

## (undated) DEVICE — X-RAY DETECTABLE SPONGES,16 PLY: Brand: VISTEC

## (undated) DEVICE — GLOVE SRG BIOGEL 7

## (undated) DEVICE — ENDOPATH XCEL BLADELESS TROCARS WITH STABILITY SLEEVES: Brand: ENDOPATH XCEL

## (undated) DEVICE — EXOFIN PRECISION PEN HIGH VISCOSITY TOPICAL SKIN ADHESIVE: Brand: EXOFIN PRECISION PEN, 1G

## (undated) DEVICE — SCD SEQUENTIAL COMPRESSION COMFORT SLEEVE LARGE KNEE LENGTH: Brand: KENDALL SCD

## (undated) DEVICE — SUT MONOCRYL 4-0 PS-2 27 IN Y426H

## (undated) DEVICE — TROCAR: Brand: KII SLEEVE

## (undated) DEVICE — NEPTUNE E-SEP SMOKE EVACUATION PENCIL, COATED, 70MM BLADE, PUSH BUTTON SWITCH: Brand: NEPTUNE E-SEP

## (undated) DEVICE — TRAVELKIT CONTAINS FIRST STEP KIT (200ML EP-4 KIT) AND SOILED SCOPE BAG - 1 KIT: Brand: TRAVELKIT CONTAINS FIRST STEP KIT AND SOILED SCOPE BAG

## (undated) DEVICE — METZENBAUM ADTEC SINGLE USE DISSECTING SCISSORS, SHAFT ONLY, MONOPOLAR, CURVED TO LEFT, WORKING LENGTH: 12 1/4", (310 MM), DIAM. 5 MM, INSULATED, DOUBLE ACTION, STERILE, DISPOSABLE, PACKAGE OF 10 PIECES: Brand: AESCULAP

## (undated) DEVICE — ENDOPATH ECHELON ENDOSCOPIC LINEAR CUTTER RELOADS, GREEN, 60MM: Brand: ECHELON ENDOPATH

## (undated) DEVICE — GLOVE INDICATOR PI UNDERGLOVE SZ 7 BLUE

## (undated) DEVICE — TOWEL SURG XR DETECT GREEN STRL RFD

## (undated) DEVICE — VISIGI 3D®  CALIBRATION SYSTEM  SIZE 36FR SLEEVE/STD: Brand: BOEHRINGER® VISIGI 3D™ SLEEVE GASTRECTOMY CALIBRATION SYSTEM, SIZE 36FR

## (undated) DEVICE — INSUFFLATION NEEDLE TO ESTABLISH PNEUMOPERITONEUM.: Brand: INSUFFLATION NEEDLE

## (undated) DEVICE — ASTOUND STANDARD SURGICAL GOWN, XL: Brand: CONVERTORS

## (undated) DEVICE — KERLIX BANDAGE ROLL: Brand: KERLIX

## (undated) DEVICE — DRAPE SHEET THREE QUARTER

## (undated) DEVICE — HARMONIC 1100 SHEARS, 36CM SHAFT LENGTH: Brand: HARMONIC

## (undated) DEVICE — 60 ML SYRINGE,REGULAR TIP: Brand: MONOJECT

## (undated) DEVICE — TUBE SET SMOKE EVAC PNEUMOCLEAR HUMIDIFIED

## (undated) DEVICE — ECHELON 3000 60MM LONG: Brand: ECHELON

## (undated) DEVICE — INTENDED FOR TISSUE SEPARATION, AND OTHER PROCEDURES THAT REQUIRE A SHARP SURGICAL BLADE TO PUNCTURE OR CUT.: Brand: BARD-PARKER SAFETY BLADES SIZE 11, STERILE

## (undated) DEVICE — GLOVE SRG BIOGEL ECLIPSE 8

## (undated) DEVICE — LUBRICANT JELLY SURGILUBE TUBE 4OZ FLIP TOP

## (undated) DEVICE — ENDOPATH ECHELON ENDOSCOPIC LINEAR CUTTER RELOADS, BLUE, 60MM: Brand: ECHELON ENDOPATH

## (undated) DEVICE — KIT, GASTRIC BYPASS: Brand: CARDINAL HEALTH

## (undated) DEVICE — LIGHT GLOVE GREEN

## (undated) DEVICE — SPONGE 4 X 4 XRAY 16 PLY STRL LF RFD

## (undated) DEVICE — UTILITY MARKER,BLACK WITH LABELS: Brand: DEVON

## (undated) DEVICE — ECHELON 60MM REINFORCEMENT: Brand: ECHLEON

## (undated) DEVICE — PAD CAST 4 IN COTTON NON STERILE

## (undated) DEVICE — TROCAR: Brand: KII® SLEEVE

## (undated) DEVICE — REM POLYHESIVE ADULT PATIENT RETURN ELECTRODE: Brand: VALLEYLAB